# Patient Record
Sex: MALE | Race: WHITE | Employment: OTHER | URBAN - METROPOLITAN AREA
[De-identification: names, ages, dates, MRNs, and addresses within clinical notes are randomized per-mention and may not be internally consistent; named-entity substitution may affect disease eponyms.]

---

## 2017-10-20 ENCOUNTER — HOSPITAL ENCOUNTER (EMERGENCY)
Age: 57
Discharge: HOME | End: 2017-10-20
Attending: EMERGENCY MEDICINE
Payer: SELF-PAY

## 2017-10-20 VITALS
TEMPERATURE: 98 F | DIASTOLIC BLOOD PRESSURE: 81 MMHG | WEIGHT: 300 LBS | SYSTOLIC BLOOD PRESSURE: 153 MMHG | OXYGEN SATURATION: 97 % | HEIGHT: 70 IN | RESPIRATION RATE: 16 BRPM | HEART RATE: 73 BPM | BODY MASS INDEX: 42.95 KG/M2

## 2017-10-20 DIAGNOSIS — B86 SCABIES: Primary | ICD-10-CM

## 2017-10-20 DIAGNOSIS — R03.0 ELEVATED BLOOD PRESSURE READING: ICD-10-CM

## 2017-10-20 PROCEDURE — 99282 EMERGENCY DEPT VISIT SF MDM: CPT

## 2017-10-20 RX ORDER — PERMETHRIN 50 MG/G
CREAM TOPICAL
Qty: 60 G | Refills: 0 | Status: SHIPPED | OUTPATIENT
Start: 2017-10-20 | End: 2017-11-19

## 2017-10-20 NOTE — DISCHARGE INSTRUCTIONS
Scabies: Care Instructions  Your Care Instructions  Scabies is a skin problem that can cause intense itching. It is caused by very tiny bugs called mites that dig just under the skin and lay eggs. An allergic reaction to the mites causes the itching. Scabies is usually spread by person-to-person contact. It is also possible, but not common, for scabies to spread through towels, clothes, and bedding. Everyone in your household should be treated. Scabies is treated with medicine. Itching may last for several weeks after treatment. Follow-up care is a key part of your treatment and safety. Be sure to make and go to all appointments, and call your doctor if you are having problems. It's also a good idea to know your test results and keep a list of the medicines you take. How can you care for yourself at home? · Use the lotion or cream your doctor recommends or prescribes. One treatment usually cures scabies. Do not use the cream again unless your doctor tells you to. · Wash all clothes, bedding, and towels that you used in the 3 days before you started treatment. Use hot water, and use the hot cycle in the dryer. Another option is to dry-clean these items. Or seal them in a plastic bag for 3 to 7 days. · Take an oral antihistamine, such as loratadine (Claritin) or diphenhydramine (Benadryl), to help stop itching. You also can use a nonprescription anti-itch cream. Read and follow all instructions on the label. · Do not have physical contact with other people or let anyone use your personal items until you have finished treatment. Do not use other people's personal items until your treatment is done. Tell people with whom you have close contact that they will need treatment if they have symptoms. · Take an oatmeal bath to help relieve itching. Add a handful of oatmeal (ground to a powder) to your bath. Or you can try an oatmeal bath product, such as Aveeno. When should you call for help?   Call your doctor now or seek immediate medical care if:  · You have signs of infection, such as:  ¨ Increased pain, swelling, warmth, or redness. ¨ Red streaks leading from the mite bites. ¨ Pus draining from a bite area. ¨ A fever. Watch closely for changes in your health, and be sure to contact your doctor if:  · Anyone else in your family has itching. · You do not get better within 2 weeks. Where can you learn more? Go to http://anu-stella.info/. Enter N067 in the search box to learn more about \"Scabies: Care Instructions. \"  Current as of: October 13, 2016  Content Version: 11.3  © 0449-2946 [facility]. Care instructions adapted under license by [employer] (which disclaims liability or warranty for this information). If you have questions about a medical condition or this instruction, always ask your healthcare professional. [place] [address] any warranty or liability for your use of this information. Elevated Blood Pressure: Care Instructions  Your Care Instructions    Blood pressure is a measure of how hard the blood pushes against the walls of your arteries. It's normal for blood pressure to go up and down throughout the day. But if it stays up over time, you have high blood pressure. Two numbers tell you your blood pressure. The first number is the systolic pressure. It shows how hard the blood pushes when your heart is pumping. The second number is the diastolic pressure. It shows how hard the blood pushes between heartbeats, when your heart is relaxed and filling with blood. An ideal blood pressure in adults is less than 120/80 (say \"120 over 80\"). High blood pressure is 140/90 or higher. You have high blood pressure if your top number is 140 or higher or your bottom number is 90 or higher, or both. The main test for high blood pressure is simple, fast, and painless.  To diagnose high blood pressure, your doctor will test your blood pressure at different times. After testing your blood pressure, your doctor may ask you to test it again when you are home. If you are diagnosed with high blood pressure, you can work with your doctor to make a long-term plan to manage it. Follow-up care is a key part of your treatment and safety. Be sure to make and go to all appointments, and call your doctor if you are having problems. It's also a good idea to know your test results and keep a list of the medicines you take. How can you care for yourself at home? · Do not smoke. Smoking increases your risk for heart attack and stroke. If you need help quitting, talk to your doctor about stop-smoking programs and medicines. These can increase your chances of quitting for good. · Stay at a healthy weight. · Try to limit how much sodium you eat to less than 2,300 milligrams (mg) a day. Your doctor may ask you to try to eat less than 1,500 mg a day. · Be physically active. Get at least 30 minutes of exercise on most days of the week. Walking is a good choice. You also may want to do other activities, such as running, swimming, cycling, or playing tennis or team sports. · Avoid or limit alcohol. Talk to your doctor about whether you can drink any alcohol. · Eat plenty of fruits, vegetables, and low-fat dairy products. Eat less saturated and total fats. · Learn how to check your blood pressure at home. When should you call for help? Call your doctor now or seek immediate medical care if:  · Your blood pressure is much higher than normal (such as 180/110 or higher). · You think high blood pressure is causing symptoms such as:  ¨ Severe headache. ¨ Blurry vision. Watch closely for changes in your health, and be sure to contact your doctor if:  · You do not get better as expected. Where can you learn more? Go to http://anu-stella.info/. Enter O668 in the search box to learn more about \"Elevated Blood Pressure: Care Instructions. \"  Current as of: April 3, 2017  Content Version: 11.3  © 4209-0389 [employer]. Care instructions adapted under license by [employer] (which disclaims liability or warranty for this information). If you have questions about a medical condition or this instruction, always ask your healthcare professional. [address] any warranty or liability for your use of this information.

## 2017-10-20 NOTE — ED PROVIDER NOTES
HPI Comments: 11:25 AM Lesli Jama is a 65 y.o. male w/ PMHx of epilepsy and melanoma who presents to the ED c/o skin irritation, possibly insect btes, on bilateral hips and inner thighs onset 4 days ago. Pt claims he has spent time with his grandkids recently who currently have ringworm. Pt states the irritation is itchy but he has not used any OTC medication or ointments on the areas. Pt denies fever and emesis but admits to occasional marijuana use, most recently last night. Pt also stated he does not think the Dilantin alone is effective in controlling his seizures. Pt has no other sx or complaints. Past Medical History:   Diagnosis Date    Cancer ([facility]     melanoma    Other ill-defined conditions(799.89)     Seizure ([facility]     Seizures ([facility]        History reviewed. No pertinent surgical history. History reviewed. No pertinent family history. Social History     Social History    Marital status: SINGLE     Spouse name: N/A    Number of children: N/A    Years of education: N/A     Occupational History    Not on file. Social History Main Topics    Smoking status: Never Smoker    Smokeless tobacco: Never Used    Alcohol use Yes      Comment: 3x/week    Drug use: Yes     Special: Marijuana      Comment: last used 10/19/17    Sexual activity: Not on file     Other Topics Concern    Not on file     Social History Narrative         ALLERGIES: Review of patient's allergies indicates no known allergies. Review of Systems   Constitutional: Negative for fever. Gastrointestinal: Negative for vomiting. Skin:        Irritation to bilateral hips and bilateral inner thighs   All other systems reviewed and are negative. Vitals:    10/20/17 1121   BP: 153/81   Pulse: 73   Resp: 16   Temp: 98 °F (36.7 °C)   SpO2: 97%   Weight: 136.1 kg (300 lb)   Height: 5' 10\" (1.778 m)            Physical Exam   Constitutional: He is oriented to person, place, and time.  He appears well-developed and well-nourished. HENT:   Head: Normocephalic and atraumatic. Neck: Neck supple. No JVD present. Musculoskeletal: He exhibits no edema. Neurological: He is alert and oriented to person, place, and time. Skin: Skin is warm and dry. Multiple scattered areas with erythema and scabbing, R upper thigh, Bl forearms, left lower abdomen  No vesicles, no petechia or purpura  No lesions in interdigit web space  No lesions on palms or soles   Psychiatric: Judgment normal.        MDM  Number of Diagnoses or Management Options  Elevated blood pressure reading:   Scabies:   Diagnosis management comments: 64 y/o male presents with rash and itching    Does not seem consistent with bed bugs, not in expected distribution, suspect scabies and pt states \"thats what my grandkids had\"  Will give permethrin, advised pcp follow up    Discussed return precautions. ED Course       Procedures  Vitals:  Patient Vitals for the past 12 hrs:   Temp Pulse Resp BP SpO2   10/20/17 1121 98 °F (36.7 °C) 73 16 153/81 97 %       Diagnosis:   1. Scabies    2. Elevated blood pressure reading        Disposition: discharged    Follow-up Information     Follow up With Details Comments [address], [place] Call in 2 days As needed [address] [facility] EMERGENCY DEPT  As needed, If symptoms worsen [address] [facility] [address] [address] [phone]           Patient's Medications   Start Taking    PERMETHRIN (ACTICIN) 5 % TOPICAL CREAM    Apply to entire body, then rinse off after 8 hours. Repeat in 1 week. Continue Taking    PHENYTOIN ER (DILANTIN ER) 100 MG ER CAPSULE    Take  by mouth two (2) times a day.  Indications: Unknown amount   These Medications have changed    No medications on file   Stop Taking    No medications on file       [address]ente acting as a scribe for and in the presence of Daniel Monsivais DO      October 20, 2017 at 11:28 AM       Provider Attestation:      I personally performed the services described in the documentation, reviewed the documentation, as recorded by the scribe in my presence, and it accurately and completely records my words and actions.  October 20, 2017 at 11:28 AM - Baylee Rush DO

## 2018-03-12 ENCOUNTER — ANESTHESIA EVENT (OUTPATIENT)
Dept: ENDOSCOPY | Age: 58
End: 2018-03-12
Payer: COMMERCIAL

## 2018-03-13 ENCOUNTER — HOSPITAL ENCOUNTER (OUTPATIENT)
Age: 58
Setting detail: OUTPATIENT SURGERY
Discharge: HOME | End: 2018-03-13
Attending: INTERNAL MEDICINE | Admitting: INTERNAL MEDICINE
Payer: COMMERCIAL

## 2018-03-13 ENCOUNTER — ANESTHESIA (OUTPATIENT)
Dept: ENDOSCOPY | Age: 58
End: 2018-03-13
Payer: COMMERCIAL

## 2018-03-13 VITALS
OXYGEN SATURATION: 100 % | SYSTOLIC BLOOD PRESSURE: 126 MMHG | RESPIRATION RATE: 16 BRPM | HEART RATE: 72 BPM | TEMPERATURE: 97.1 F | DIASTOLIC BLOOD PRESSURE: 76 MMHG | WEIGHT: 315 LBS | BODY MASS INDEX: 45.1 KG/M2 | HEIGHT: 70 IN

## 2018-03-13 PROCEDURE — 74011250636 HC RX REV CODE- 250/636: Performed by: NURSE ANESTHETIST, CERTIFIED REGISTERED

## 2018-03-13 PROCEDURE — 88305 TISSUE EXAM BY PATHOLOGIST: CPT | Performed by: INTERNAL MEDICINE

## 2018-03-13 PROCEDURE — 76060000031 HC ANESTHESIA FIRST 0.5 HR: Performed by: INTERNAL MEDICINE

## 2018-03-13 PROCEDURE — 74011000250 HC RX REV CODE- 250

## 2018-03-13 PROCEDURE — 77030034690 HC DEV ENDO SNR RETRV STRC -B: Performed by: INTERNAL MEDICINE

## 2018-03-13 PROCEDURE — 77030031670 HC DEV INFL ENDOTEK BIG60 MRTM -B: Performed by: INTERNAL MEDICINE

## 2018-03-13 PROCEDURE — 76040000019: Performed by: INTERNAL MEDICINE

## 2018-03-13 PROCEDURE — 77030011640 HC PAD GRND REM COVD -A: Performed by: INTERNAL MEDICINE

## 2018-03-13 PROCEDURE — 74011250636 HC RX REV CODE- 250/636

## 2018-03-13 RX ORDER — FAMOTIDINE 20 MG/1
20 TABLET, FILM COATED ORAL ONCE
Status: DISCONTINUED | OUTPATIENT
Start: 2018-03-13 | End: 2018-03-13 | Stop reason: HOSPADM

## 2018-03-13 RX ORDER — LIDOCAINE HYDROCHLORIDE 20 MG/ML
INJECTION, SOLUTION EPIDURAL; INFILTRATION; INTRACAUDAL; PERINEURAL AS NEEDED
Status: DISCONTINUED | OUTPATIENT
Start: 2018-03-13 | End: 2018-03-13 | Stop reason: HOSPADM

## 2018-03-13 RX ORDER — SODIUM CHLORIDE 0.9 % (FLUSH) 0.9 %
5-10 SYRINGE (ML) INJECTION EVERY 8 HOURS
Status: CANCELLED | OUTPATIENT
Start: 2018-03-13 | End: 2018-03-13

## 2018-03-13 RX ORDER — SODIUM CHLORIDE, SODIUM LACTATE, POTASSIUM CHLORIDE, CALCIUM CHLORIDE 600; 310; 30; 20 MG/100ML; MG/100ML; MG/100ML; MG/100ML
75 INJECTION, SOLUTION INTRAVENOUS CONTINUOUS
Status: DISCONTINUED | OUTPATIENT
Start: 2018-03-13 | End: 2018-03-13 | Stop reason: HOSPADM

## 2018-03-13 RX ORDER — SODIUM CHLORIDE 0.9 % (FLUSH) 0.9 %
5-10 SYRINGE (ML) INJECTION AS NEEDED
Status: CANCELLED | OUTPATIENT
Start: 2018-03-13 | End: 2018-03-13

## 2018-03-13 RX ORDER — PROPOFOL 10 MG/ML
INJECTION, EMULSION INTRAVENOUS AS NEEDED
Status: DISCONTINUED | OUTPATIENT
Start: 2018-03-13 | End: 2018-03-13 | Stop reason: HOSPADM

## 2018-03-13 RX ORDER — LIDOCAINE HYDROCHLORIDE 10 MG/ML
0.1 INJECTION, SOLUTION EPIDURAL; INFILTRATION; INTRACAUDAL; PERINEURAL AS NEEDED
Status: DISCONTINUED | OUTPATIENT
Start: 2018-03-13 | End: 2018-03-13 | Stop reason: HOSPADM

## 2018-03-13 RX ORDER — DEXTROMETHORPHAN/PSEUDOEPHED 2.5-7.5/.8
1.2 DROPS ORAL
Status: CANCELLED | OUTPATIENT
Start: 2018-03-13

## 2018-03-13 RX ADMIN — PROPOFOL 50 MG: 10 INJECTION, EMULSION INTRAVENOUS at 08:24

## 2018-03-13 RX ADMIN — SODIUM CHLORIDE, SODIUM LACTATE, POTASSIUM CHLORIDE, AND CALCIUM CHLORIDE: 600; 310; 30; 20 INJECTION, SOLUTION INTRAVENOUS at 08:15

## 2018-03-13 RX ADMIN — PROPOFOL 20 MG: 10 INJECTION, EMULSION INTRAVENOUS at 08:31

## 2018-03-13 RX ADMIN — PROPOFOL 50 MG: 10 INJECTION, EMULSION INTRAVENOUS at 08:25

## 2018-03-13 RX ADMIN — PROPOFOL 50 MG: 10 INJECTION, EMULSION INTRAVENOUS at 08:35

## 2018-03-13 RX ADMIN — PROPOFOL 50 MG: 10 INJECTION, EMULSION INTRAVENOUS at 08:26

## 2018-03-13 RX ADMIN — LIDOCAINE HYDROCHLORIDE 50 MG: 20 INJECTION, SOLUTION EPIDURAL; INFILTRATION; INTRACAUDAL; PERINEURAL at 08:23

## 2018-03-13 RX ADMIN — PROPOFOL 20 MG: 10 INJECTION, EMULSION INTRAVENOUS at 08:33

## 2018-03-13 RX ADMIN — PROPOFOL 30 MG: 10 INJECTION, EMULSION INTRAVENOUS at 08:29

## 2018-03-13 RX ADMIN — PROPOFOL 50 MG: 10 INJECTION, EMULSION INTRAVENOUS at 08:23

## 2018-03-13 NOTE — ANESTHESIA POSTPROCEDURE EVALUATION
Post-Anesthesia Evaluation and Assessment    Patient: Stanislav Reynoso MRN: [id number]  SSN: [id number]    YOB: 1960  Age: 65 y.o. Sex: male       Cardiovascular Function/Vital Signs  Visit Vitals    /76    Pulse 72    Temp 36.2 °C (97.1 °F)    Resp 16    Ht 5' 10\" (1.778 m)    Wt 146.1 kg (322 lb)    SpO2 100%    BMI 46.2 kg/m2       Patient is status post MAC anesthesia for Procedure(s):  COLONOSCOPY. Nausea/Vomiting: None    Postoperative hydration reviewed and adequate. Pain:  Pain Scale 1: Numeric (0 - 10) (03/13/18 0841)  Pain Intensity 1: 0 (03/13/18 0841)   Managed    Neurological Status: At baseline    Mental Status and Level of Consciousness: Arousable    Pulmonary Status:   O2 Device: Room air (03/13/18 0843)   Adequate oxygenation and airway patent    Complications related to anesthesia: None    Post-anesthesia assessment completed.  No concerns    Signed By: Mann Schaeffer MD     March 13, 2018

## 2018-03-13 NOTE — DISCHARGE INSTRUCTIONS
Colonoscopy: What to Expect at [address] [place] [address]  After you have a colonoscopy, you will stay at the clinic for 1 to 2 hours until the medicines wear off. Then you can go home. But you will need to arrange for a ride. Your doctor will tell you when you can eat and do your other usual activities. Your doctor will talk to you about when you will need your next colonoscopy. Your doctor can help you decide how often you need to be checked. This will depend on the results of your test and your risk for colorectal cancer. After the test, you may be bloated or have gas pains. You may need to pass gas. If a biopsy was done or a polyp was removed, you may have streaks of blood in your stool (feces) for a few days. This care sheet gives you a general idea about how long it will take for you to recover. But each person recovers at a different pace. Follow the steps below to get better as quickly as possible. How can you care for yourself at home? Activity  ? · Rest when you feel tired. ? · You can do your normal activities when it feels okay to do so. Diet  ? · Follow your doctor's directions for eating. ? · Unless your doctor has told you not to, drink plenty of fluids. This helps to replace the fluids that were lost during the colon prep. ? · Do not drink alcohol. Medicines  ? · Your doctor will tell you if and when you can restart your medicines. He or she will also give you instructions about taking any new medicines. ? · If you take blood thinners, such as warfarin (Coumadin), clopidogrel (Plavix), or aspirin, be sure to talk to your doctor. He or she will tell you if and when to start taking those medicines again. Make sure that you understand exactly what your doctor wants you to do. ? · If polyps were removed or a biopsy was done during the test, your doctor may tell you not to take aspirin or other anti-inflammatory medicines for a few days.  These include ibuprofen (Advil, Motrin) and naproxen (Aleve). Other instructions  ? · For your safety, do not drive or operate machinery until the medicine wears off and you can think clearly. Your doctor may tell you not to drive or operate machinery until the day after your test.   ? · Do not sign legal documents or make major decisions until the medicine wears off and you can think clearly. The anesthesia can make it hard for you to fully understand what you are agreeing to. Follow-up care is a key part of your treatment and safety. Be sure to make and go to all appointments, and call your doctor if you are having problems. It's also a good idea to know your test results and keep a list of the medicines you take. When should you call for help? Call 911 anytime you think you may need emergency care. For example, call if:  ? · You passed out (lost consciousness). ? · You pass maroon or bloody stools. ? · You have trouble breathing. ?Call your doctor now or seek immediate medical care if:  ? · You have pain that does not get better after you take pain medicine. ? · You are sick to your stomach or cannot drink fluids. ? · You have new or worse belly pain. ? · You have blood in your stools. ? · You have a fever. ? · You cannot pass stools or gas. ? Watch closely for changes in your health, and be sure to contact your doctor if you have any problems. Where can you learn more? Go to http://anu-stella.info/. Enter E264 in the search box to learn more about \"Colonoscopy: What to Expect at Home. \"  Current as of: May 12, 2017  Content Version: 11.4  © 7793-7591 [employer]. Care instructions adapted under license by [employer] (which disclaims liability or warranty for this information).  If you have questions about a medical condition or this instruction, always ask your healthcare professional. [address] any warranty or liability for your use of this information. DISCHARGE SUMMARY from Nurse    PATIENT INSTRUCTIONS:    After general anesthesia or intravenous sedation, for 24 hours or while taking prescription Narcotics:  · Limit your activities  · Do not drive and operate hazardous machinery  · Do not make important personal or business decisions  · Do  not drink alcoholic beverages  · If you have not urinated within 8 hours after discharge, please contact your surgeon on call. Report the following to your surgeon:  · Excessive pain, swelling, redness or odor of or around the surgical area  · Temperature over 100.5  · Nausea and vomiting lasting longer than 4 hours or if unable to take medications  · Any signs of decreased circulation or nerve impairment to extremity: change in color, persistent  numbness, tingling, coldness or increase pain  · Any questions    What to do at Home:    These are general instructions for a healthy lifestyle:    No smoking/ No tobacco products/ Avoid exposure to second hand smoke  Surgeon General's Warning:  Quitting smoking now greatly reduces serious risk to your health. Obesity, smoking, and sedentary lifestyle greatly increases your risk for illness    A healthy diet, regular physical exercise & weight monitoring are important for maintaining a healthy lifestyle    You may be retaining fluid if you have a history of heart failure or if you experience any of the following symptoms:  Weight gain of 3 pounds or more overnight or 5 pounds in a week, increased swelling in our hands or feet or shortness of breath while lying flat in bed. Please call your doctor as soon as you notice any of these symptoms; do not wait until your next office visit.     Recognize signs and symptoms of STROKE:    F-face looks uneven    A-arms unable to move or move unevenly    S-speech slurred or non-existent    T-time-call 911 as soon as signs and symptoms begin-DO NOT go       Back to bed or wait to see if you get better-TIME IS BRAIN. Warning Signs of HEART ATTACK     Call 911 if you have these symptoms:   Chest discomfort. Most heart attacks involve discomfort in the center of the chest that lasts more than a few minutes, or that goes away and comes back. It can feel like uncomfortable pressure, squeezing, fullness, or pain.  Discomfort in other areas of the upper body. Symptoms can include pain or discomfort in one or both arms, the back, neck, jaw, or stomach.  Shortness of breath with or without chest discomfort.  Other signs may include breaking out in a cold sweat, nausea, or lightheadedness. Don't wait more than five minutes to call 911 - MINUTES MATTER! Fast action can save your life. Calling 911 is almost always the fastest way to get lifesaving treatment. Emergency Medical Services staff can begin treatment when they arrive -- up to an hour sooner than if someone gets to the hospital by car. The discharge information has been reviewed with the patient. The patient verbalized understanding. Discharge medications reviewed with the patient and appropriate educational materials and side effects teaching were provided. ___________________________________________________________________________________________________________________________________  Patient armband removed and given to patient to take home.   Patient was informed of the privacy risks if armband lost or stolen

## 2018-03-13 NOTE — H&P
Gastroenterology Consult     Referring Physician: Zac Hdez    Consult Date: 3/13/2018     Subjective:     Chief Complaint: screening colonoscopy  History of Present Illness: Sasha Wolfe is a 65 y.o. male who is seen in consultation for screening colonoscopy. Patient was evaluated and examined in the office. Please see scanned note. No interval changes in medical status or examination. Past Medical History:   Diagnosis Date    Cancer ([address]     melanoma    Other ill-defined conditions(799.89)     Seizure ([facility]     Seizures ([facility] [address]      Past Surgical History:   Procedure Laterality Date    HX APPENDECTOMY        History reviewed. No pertinent family history. Social History   Substance Use Topics    Smoking status: Never Smoker    Smokeless tobacco: Never Used    Alcohol use 6.0 oz/week     10 Cans of beer per week      No Known Allergies  Current Facility-Administered Medications   Medication Dose Route Frequency    lidocaine (PF) (XYLOCAINE) 10 mg/mL (1 %) injection 0.1 mL  0.1 mL SubCUTAneous PRN    lactated Ringers infusion  75 mL/hr IntraVENous CONTINUOUS    famotidine (PEPCID) tablet 20 mg  20 mg Oral ONCE        Review of Systems:  A detailed 10 organ review of systems is obtained with pertinent positives as listed in the History of Present Illness and Past Medical History. All others are negative. Objective:     Physical Exam:  Visit Vitals    /80    Pulse 87    Temp 97.1 °F (36.2 °C)    Resp 15    Ht 5' 10\" (1.778 m)    Wt 146.1 kg (322 lb)    SpO2 97%    BMI 46.2 kg/m2        Skin:  Extremities and face reveal no rashes. No simmons erythema. No telangiectasias on the chest wall. HEENT: Sclerae anicteric. Extra-occular muscles are intact. No oral ulcers. No abnormal pigmentation of the lips. The neck is supple. Cardiovascular: Regular rate and rhythm. No murmurs, gallops, or rubs. PMI nondisplaced. Carotids without bruits.   Respiratory:  Comfortable breathing with no accessory muscle use. Clear breath sounds with no wheezes, rales, or rhonchi. GI:  Abdomen nondistended, soft, and nontender. Normal active bowel sounds. No enlargement of the liver or spleen. No masses palpable. Rectal:  Deferred  Musculoskeletal:  No pitting edema of the lower legs. Extremities have good range of motion. No costovertebral tenderness. Neurological:  Gross memory appears intact. Patient is alert and oriented. Psychiatric:  Mood appears appropriate with judgement intact. Lymphatic:  No cervical or supraclavicular adenopathy.       Assessment/Plan:     Screening colonoscopy as planned

## 2018-03-13 NOTE — ANESTHESIA PREPROCEDURE EVALUATION
Anesthetic History   No history of anesthetic complications            Review of Systems / Medical History  Patient summary reviewed and pertinent labs reviewed    Pulmonary  Within defined limits                 Neuro/Psych     seizures: well controlled         Cardiovascular  Within defined limits                Exercise tolerance: >4 METS     GI/Hepatic/Renal  Within defined limits              Endo/Other        Cancer     Other Findings   Comments: Documentation of current medication  Current medications obtained, documented and obtained? YES      Risk Factors for Postoperative nausea/vomiting:       History of postoperative nausea/vomiting? NO       Female? NO       Motion sickness? NO       Intended opioid administration for postoperative analgesia? NO      Smoking Abstinence:  Current Smoker? NO  Elective Surgery? YES  Seen preoperatively by anesthesiologist or proxy prior to day of surgery? YES  Pt abstained from smoking 24 hours prior to anesthesia?  N/A    Preventive care/screening for High Blood Pressure:  Aged 18 years and older: YES  Screened for high blood pressure: YES  Patients with high blood pressure referred to primary care provider   for BP management: YES                 Physical Exam    Airway  Mallampati: II  TM Distance: 4 - 6 cm  Neck ROM: normal range of motion   Mouth opening: Normal     Cardiovascular    Rhythm: regular  Rate: normal         Dental  No notable dental hx       Pulmonary  Breath sounds clear to auscultation               Abdominal  GI exam deferred       Other Findings            Anesthetic Plan    ASA: 3  Anesthesia type: MAC            Anesthetic plan and risks discussed with: Patient

## 2018-03-13 NOTE — IP AVS SNAPSHOT
Katerina Mei 
 
 
 [address] 
[phone] Patient: Porsha Merida MRN: [id number] Manuel Valdes About your hospitalization You were admitted on:  March 13, 2018 You last received care in the:  [facility] ENDOSCOPY You were discharged on:  March 13, 2018 Why you were hospitalized Your primary diagnosis was:  Not on File Follow-up Information None Discharge Orders None A check vandana indicates which time of day the medication should be taken. My Medications ASK your doctor about these medications Instructions Each Dose to Equal  
 Morning Noon Evening Bedtime ADVIL 100 mg tablet Generic drug:  ibuprofen Your last dose was: Your next dose is: Take 100 mg by mouth every six (6) hours as needed for Pain. 100 mg  
    
   
   
   
  
 phenytoin  mg ER capsule Commonly known as:  DILANTIN ER Your last dose was: Your next dose is: Take  by mouth two (2) times a day. Indications: Unknown amount Discharge Instructions Colonoscopy: What to Expect at [facility] Your Recovery After you have a colonoscopy, you will stay at the clinic for 1 to 2 hours until the medicines wear off. Then you can go home. But you will need to arrange for a ride. Your doctor will tell you when you can eat and do your other usual activities. Your doctor will talk to you about when you will need your next colonoscopy. Your doctor can help you decide how often you need to be checked. This will depend on the results of your test and your risk for colorectal cancer. After the test, you may be bloated or have gas pains. You may need to pass gas. If a biopsy was done or a polyp was removed, you may have streaks of blood in your stool (feces) for a few days.  
This care sheet gives you a general idea about how long it will take for you to recover. But each person recovers at a different pace. Follow the steps below to get better as quickly as possible. How can you care for yourself at home? Activity ? · Rest when you feel tired. ? · You can do your normal activities when it feels okay to do so. Diet ? · Follow your doctor's directions for eating. ? · Unless your doctor has told you not to, drink plenty of fluids. This helps to replace the fluids that were lost during the colon prep. ? · Do not drink alcohol. Medicines ? · Your doctor will tell you if and when you can restart your medicines. He or she will also give you instructions about taking any new medicines. ? · If you take blood thinners, such as warfarin (Coumadin), clopidogrel (Plavix), or aspirin, be sure to talk to your doctor. He or she will tell you if and when to start taking those medicines again. Make sure that you understand exactly what your doctor wants you to do. ? · If polyps were removed or a biopsy was done during the test, your doctor may tell you not to take aspirin or other anti-inflammatory medicines for a few days. These include ibuprofen (Advil, Motrin) and naproxen (Aleve). Other instructions ? · For your safety, do not drive or operate machinery until the medicine wears off and you can think clearly. Your doctor may tell you not to drive or operate machinery until the day after your test.  
? · Do not sign legal documents or make major decisions until the medicine wears off and you can think clearly. The anesthesia can make it hard for you to fully understand what you are agreeing to. Follow-up care is a key part of your treatment and safety. Be sure to make and go to all appointments, and call your doctor if you are having problems. It's also a good idea to know your test results and keep a list of the medicines you take. When should you call for help? Call 911 anytime you think you may need emergency care. For example, call if: ? · You passed out (lost consciousness). ? · You pass maroon or bloody stools. ? · You have trouble breathing. ?Call your doctor now or seek immediate medical care if: 
? · You have pain that does not get better after you take pain medicine. ? · You are sick to your stomach or cannot drink fluids. ? · You have new or worse belly pain. ? · You have blood in your stools. ? · You have a fever. ? · You cannot pass stools or gas. ? Watch closely for changes in your health, and be sure to contact your doctor if you have any problems. Where can you learn more? Go to http://anu-stella.info/. Enter E264 in the search box to learn more about \"Colonoscopy: What to Expect at Home. \" Current as of: May 12, 2017 Content Version: 11.4 © 0329-6305 [employer]. Care instructions adapted under license by [employer] (which disclaims liability or warranty for this information). If you have questions about a medical condition or this instruction, always ask your healthcare professional. [place] [address] any warranty or liability for your use of this information. DISCHARGE SUMMARY from Nurse PATIENT INSTRUCTIONS: 
 
After general anesthesia or intravenous sedation, for 24 hours or while taking prescription Narcotics: · Limit your activities · Do not drive and operate hazardous machinery · Do not make important personal or business decisions · Do  not drink alcoholic beverages · If you have not urinated within 8 hours after discharge, please contact your surgeon on call. Report the following to your surgeon: 
· Excessive pain, swelling, redness or odor of or around the surgical area · Temperature over 100.5 · Nausea and vomiting lasting longer than 4 hours or if unable to take medications · Any signs of decreased circulation or nerve impairment to extremity: change in color, persistent  numbness, tingling, coldness or increase pain · Any questions What to do at Home: These are general instructions for a healthy lifestyle: No smoking/ No tobacco products/ Avoid exposure to second hand smoke Surgeon General's Warning:  Quitting smoking now greatly reduces serious risk to your health. Obesity, smoking, and sedentary lifestyle greatly increases your risk for illness A healthy diet, regular physical exercise & weight monitoring are important for maintaining a healthy lifestyle You may be retaining fluid if you have a history of heart failure or if you experience any of the following symptoms:  Weight gain of 3 pounds or more overnight or 5 pounds in a week, increased swelling in our hands or feet or shortness of breath while lying flat in bed. Please call your doctor as soon as you notice any of these symptoms; do not wait until your next office visit. Recognize signs and symptoms of STROKE: 
 
F-face looks uneven A-arms unable to move or move unevenly S-speech slurred or non-existent T-time-call 911 as soon as signs and symptoms begin-DO NOT go Back to bed or wait to see if you get better-TIME IS BRAIN. Warning Signs of HEART ATTACK Call 911 if you have these symptoms: 
? Chest discomfort. Most heart attacks involve discomfort in the center of the chest that lasts more than a few minutes, or that goes away and comes back. It can feel like uncomfortable pressure, squeezing, fullness, or pain. ? Discomfort in other areas of the upper body. Symptoms can include pain or discomfort in one or both arms, the back, neck, jaw, or stomach. ? Shortness of breath with or without chest discomfort. ? Other signs may include breaking out in a cold sweat, nausea, or lightheadedness. Don't wait more than five minutes to call [employer]! Fast action can save your life. Calling 911 is almost always the fastest way to get lifesaving treatment.  Emergency Medical Services staff can begin treatment when they arrive  up to an hour sooner than if someone gets to the hospital by car. The discharge information has been reviewed with the patient. The patient verbalized understanding. Discharge medications reviewed with the patient and appropriate educational materials and side effects teaching were provided. ___________________________________________________________________________________________________________________________________ Patient armband removed and given to patient to take home. Patient was informed of the privacy risks if armband lost or stolen Introducing [facility]! Venu Mejia introduces [employer] patient portal. Now you can access parts of your medical record, email your doctor's office, and request medication refills online. 1. In your internet browser, go to [email or web address] 2. Click on the First Time User? Click Here link in the Sign In box. You will see the New Member Sign Up page. 3. Enter your [employer] Access Code exactly as it appears below. You will not need to use this code after youve completed the sign-up process. If you do not sign up before the expiration date, you must request a new code. · [employer] Access Code: [email or web address] Expires: 6/11/2018  8:23 AM 
 
4. Enter the last four digits of your Social Security Number (xxxx) and Date of Birth (mm/dd/yyyy) as indicated and click Submit. You will be taken to the next sign-up page. 5. Create a [employer] ID. This will be your [employer] login ID and cannot be changed, so think of one that is secure and easy to remember. 6. Create a [employer] password. You can change your password at any time. 7. Enter your Password Reset Question and Answer. This can be used at a later time if you forget your password. 8. Enter your e-mail address. You will receive e-mail notification when new information is available in [address] 9. Click Sign Up. You can now view and download portions of your medical record. 10. Click the Download Summary menu link to download a portable copy of your medical information. If you have questions, please visit the Frequently Asked Questions section of the [employer] website. Remember, [employer] is NOT to be used for urgent needs. For medical emergencies, dial 911. Now available from your iPhone and Android! Providers Seen During Your Hospitalization Provider Specialty Primary office phone Romelia Carolina MD Gastroenterology [phone] Your Primary Care Physician (PCP) Primary Care Physician Office Phone Office Fax [address] [phone] You are allergic to the following No active allergies Recent Documentation Height Weight BMI Smoking Status 1.778 m 146.1 kg 46.2 kg/m2 Never Smoker Emergency Contacts Name Discharge Info Relation Home Work Mobile Marlee Gottlieb DISCHARGE CAREGIVER [3] Sister [23]   [phone] Patient Belongings The following personal items are in your possession at time of discharge: 
  Dental Appliances: None  Visual Aid: None Please provide this summary of care documentation to your next provider. Signatures-by signing, you are acknowledging that this After Visit Summary has been reviewed with you and you have received a copy. Patient Signature:  ____________________________________________________________ Date:  ____________________________________________________________  
  
Jalyn Velazco Provider Signature:  ____________________________________________________________ Date:  ____________________________________________________________

## 2018-03-15 NOTE — PROCEDURES
Colonoscopy Report    Patient: Cliff Chaudhry MRN: [id number]  SSN: [id number]    YOB: 1960  Age: 65 y.o. Sex: male      Date of Procedure: 3/15/2018    IMPRESSION:  1. Ascending colon polyp, 6 mm, flat, status post forceps removed  2. Sigmoid colon polyp, 8 mm, hot snare removed  3. Diverticulosis, mild  4. Internal hemorrhoids    RECOMMENDATIONS:  1. Resume regular diet, Recommend high fiber. 2. Will contact with polyp results in 2 weeks. These results will determine timing to next screening. Patient will be instructed to contact our office if they have not received the results by three weeks. Indication:  Personal history of colon polyps (screening only)  Procedure Performed: Colonoscopy polypectomy (snare cautery), polypectomy (cold biopsy)  Endoscopist: Danny Madden MD  Assistant: Endoscopy Technician-1: Martha Richey  Endoscopy RN-1: Anant Wolfe RN  ASA: ASA 2 - Patient with mild systemic disease with no functional limitations  Mallampati Score: II (soft palate, uvula, fauces visible)  Anesthesia: MAC anesthesia Propofol  Endoscope:     [x]  CF H 190AL   []  PCF H190AL   []  GIF H 190    Extent of Examination:cecum, which was identified by the ileocecal valve and appendiceal orifice  Quality of Preparation:     []  Excellent   [x]  Very Good   [] Fair but adequate   [] Fair, inadequate   []  Poor      Technique: The procedure was discussed with the patient including risks, benefits, alternatives including risks of IV sedation, bleeding, perforation and missed polyp. A safety timeout was performed. The patient was given incremental doses of intravenous sedation to achieve moderate sedation. The patients vital signs were monitored at all times including heart rate, rhythm, blood pressure and oxygen saturation. The patient was placed in left lateral position. When adequate sedation was achieved a perianal inspection and a digital rectal exam were performed.  Video colonoscope was introduced into the rectum and advanced under direct vision up to the cecum, which was identified by the ileocecal valve and appendiceal orifice. The cecum was identified by IC valve, appendiceal orifice and crows foot. With adequate insufflation and maneuvering of the withdrawing scope, the colonic mucosa was visualized carefully. Retroflexion was performed in the rectum and the distal rectum visualized. The patient tolerated the procedure very well and was transferred to recovery area. Findings:  5. Ascending colon polyp, 6 mm, flat, status post forceps removed  6. Sigmoid colon polyp, 8 mm, hot snare removed  7. Diverticulosis, mild  8.  Internal hemorrhoids      EBL:Minimal  Specimen:   ID Type Source Tests Collected by Time Destination   1 : bx polyp Preservative Colon, Ascending  Chris Rodriguez MD 3/13/2018 1183 Pathology   2 : hotsnare polyp Preservative Sigmoid  Chris Rodriguez MD 3/13/2018 4925 Pathology       Chris Rodriguez MD  March 15, 2018  8:34 AM

## 2018-07-20 ENCOUNTER — HOSPITAL ENCOUNTER (EMERGENCY)
Age: 58
Discharge: HOME | End: 2018-07-20
Attending: EMERGENCY MEDICINE
Payer: COMMERCIAL

## 2018-07-20 ENCOUNTER — APPOINTMENT (OUTPATIENT)
Dept: ULTRASOUND IMAGING | Age: 58
End: 2018-07-20
Attending: EMERGENCY MEDICINE
Payer: COMMERCIAL

## 2018-07-20 VITALS
BODY MASS INDEX: 44.38 KG/M2 | TEMPERATURE: 97.9 F | WEIGHT: 310 LBS | DIASTOLIC BLOOD PRESSURE: 68 MMHG | SYSTOLIC BLOOD PRESSURE: 114 MMHG | HEIGHT: 70 IN | OXYGEN SATURATION: 97 % | RESPIRATION RATE: 16 BRPM | HEART RATE: 103 BPM

## 2018-07-20 DIAGNOSIS — R10.11 ABDOMINAL PAIN, RIGHT UPPER QUADRANT: Primary | ICD-10-CM

## 2018-07-20 LAB
ALBUMIN SERPL-MCNC: 2.7 G/DL (ref 3.4–5)
ALBUMIN/GLOB SERPL: 0.6 {RATIO} (ref 0.8–1.7)
ALP SERPL-CCNC: 132 U/L (ref 45–117)
ALT SERPL-CCNC: 35 U/L (ref 16–61)
ANION GAP SERPL CALC-SCNC: 6 MMOL/L (ref 3–18)
APPEARANCE UR: NORMAL
APTT PPP: 38.7 SEC (ref 23–36.4)
AST SERPL-CCNC: 53 U/L (ref 15–37)
BASOPHILS # BLD: 0.1 K/UL (ref 0–0.1)
BASOPHILS NFR BLD: 0 % (ref 0–2)
BILIRUB SERPL-MCNC: 1.9 MG/DL (ref 0.2–1)
BILIRUB UR QL: NEGATIVE
BUN SERPL-MCNC: 7 MG/DL (ref 7–18)
BUN/CREAT SERPL: 11 (ref 12–20)
CALCIUM SERPL-MCNC: 8.3 MG/DL (ref 8.5–10.1)
CHLORIDE SERPL-SCNC: 105 MMOL/L (ref 100–108)
CK MB CFR SERPL CALC: 3.1 % (ref 0–4)
CK MB SERPL-MCNC: 1.8 NG/ML (ref 5–25)
CK SERPL-CCNC: 58 U/L (ref 39–308)
CO2 SERPL-SCNC: 27 MMOL/L (ref 21–32)
COLOR UR: NORMAL
CREAT SERPL-MCNC: 0.62 MG/DL (ref 0.6–1.3)
DIFFERENTIAL METHOD BLD: ABNORMAL
EOSINOPHIL # BLD: 0.3 K/UL (ref 0–0.4)
EOSINOPHIL NFR BLD: 2 % (ref 0–5)
ERYTHROCYTE [DISTWIDTH] IN BLOOD BY AUTOMATED COUNT: 14.8 % (ref 11.6–14.5)
GLOBULIN SER CALC-MCNC: 4.9 G/DL (ref 2–4)
GLUCOSE SERPL-MCNC: 128 MG/DL (ref 74–99)
GLUCOSE UR STRIP.AUTO-MCNC: NEGATIVE MG/DL
HCT VFR BLD AUTO: 45.6 % (ref 36–48)
HGB BLD-MCNC: 15.8 G/DL (ref 13–16)
HGB UR QL STRIP: NEGATIVE
INR PPP: 1.5 (ref 0.8–1.2)
KETONES UR QL STRIP.AUTO: NEGATIVE MG/DL
LEUKOCYTE ESTERASE UR QL STRIP.AUTO: NEGATIVE
LIPASE SERPL-CCNC: 256 U/L (ref 73–393)
LYMPHOCYTES # BLD: 3 K/UL (ref 0.9–3.6)
LYMPHOCYTES NFR BLD: 19 % (ref 21–52)
MCH RBC QN AUTO: 31.6 PG (ref 24–34)
MCHC RBC AUTO-ENTMCNC: 34.6 G/DL (ref 31–37)
MCV RBC AUTO: 91.2 FL (ref 74–97)
MONOCYTES # BLD: 2.4 K/UL (ref 0.05–1.2)
MONOCYTES NFR BLD: 15 % (ref 3–10)
NEUTS SEG # BLD: 10.2 K/UL (ref 1.8–8)
NEUTS SEG NFR BLD: 64 % (ref 40–73)
NITRITE UR QL STRIP.AUTO: NEGATIVE
PH UR STRIP: 6 [PH] (ref 5–8)
PLATELET # BLD AUTO: 609 K/UL (ref 135–420)
PMV BLD AUTO: 10.5 FL (ref 9.2–11.8)
POTASSIUM SERPL-SCNC: 4.5 MMOL/L (ref 3.5–5.5)
PROT SERPL-MCNC: 7.6 G/DL (ref 6.4–8.2)
PROT UR STRIP-MCNC: NEGATIVE MG/DL
PROTHROMBIN TIME: 17.9 SEC (ref 11.5–15.2)
RBC # BLD AUTO: 5 M/UL (ref 4.7–5.5)
SODIUM SERPL-SCNC: 138 MMOL/L (ref 136–145)
SP GR UR REFRACTOMETRY: 1.01 (ref 1–1.03)
TROPONIN I SERPL-MCNC: <0.02 NG/ML (ref 0–0.04)
UROBILINOGEN UR QL STRIP.AUTO: 1 EU/DL (ref 0.2–1)
WBC # BLD AUTO: 16 K/UL (ref 4.6–13.2)

## 2018-07-20 PROCEDURE — 81003 URINALYSIS AUTO W/O SCOPE: CPT | Performed by: NURSE PRACTITIONER

## 2018-07-20 PROCEDURE — 85025 COMPLETE CBC W/AUTO DIFF WBC: CPT | Performed by: NURSE PRACTITIONER

## 2018-07-20 PROCEDURE — 74011250636 HC RX REV CODE- 250/636

## 2018-07-20 PROCEDURE — 74011250636 HC RX REV CODE- 250/636: Performed by: EMERGENCY MEDICINE

## 2018-07-20 PROCEDURE — 96374 THER/PROPH/DIAG INJ IV PUSH: CPT

## 2018-07-20 PROCEDURE — 74011250637 HC RX REV CODE- 250/637: Performed by: EMERGENCY MEDICINE

## 2018-07-20 PROCEDURE — 82550 ASSAY OF CK (CPK): CPT | Performed by: NURSE PRACTITIONER

## 2018-07-20 PROCEDURE — 76705 ECHO EXAM OF ABDOMEN: CPT

## 2018-07-20 PROCEDURE — 93005 ELECTROCARDIOGRAM TRACING: CPT

## 2018-07-20 PROCEDURE — 99285 EMERGENCY DEPT VISIT HI MDM: CPT

## 2018-07-20 PROCEDURE — 80053 COMPREHEN METABOLIC PANEL: CPT | Performed by: NURSE PRACTITIONER

## 2018-07-20 PROCEDURE — 83690 ASSAY OF LIPASE: CPT | Performed by: NURSE PRACTITIONER

## 2018-07-20 PROCEDURE — 85610 PROTHROMBIN TIME: CPT | Performed by: EMERGENCY MEDICINE

## 2018-07-20 PROCEDURE — 85730 THROMBOPLASTIN TIME PARTIAL: CPT | Performed by: EMERGENCY MEDICINE

## 2018-07-20 RX ORDER — MORPHINE SULFATE 4 MG/ML
4 INJECTION, SOLUTION INTRAMUSCULAR; INTRAVENOUS
Status: DISCONTINUED | OUTPATIENT
Start: 2018-07-20 | End: 2018-07-20

## 2018-07-20 RX ORDER — OXYCODONE HYDROCHLORIDE 5 MG/1
10 TABLET ORAL
Status: COMPLETED | OUTPATIENT
Start: 2018-07-20 | End: 2018-07-20

## 2018-07-20 RX ORDER — MORPHINE SULFATE 1 MG/ML
4 INJECTION, SOLUTION EPIDURAL; INTRATHECAL; INTRAVENOUS
Status: DISCONTINUED | OUTPATIENT
Start: 2018-07-20 | End: 2018-07-20 | Stop reason: HOSPADM

## 2018-07-20 RX ORDER — OMEPRAZOLE 20 MG/1
20 CAPSULE, DELAYED RELEASE ORAL DAILY
Qty: 28 CAP | Refills: 0 | Status: SHIPPED | OUTPATIENT
Start: 2018-07-20 | End: 2018-08-03

## 2018-07-20 RX ORDER — OXYCODONE HYDROCHLORIDE 5 MG/1
5 TABLET ORAL
Qty: 15 TAB | Refills: 0 | Status: SHIPPED | OUTPATIENT
Start: 2018-07-20 | End: 2019-10-25

## 2018-07-20 RX ORDER — ONDANSETRON 4 MG/1
4 TABLET, ORALLY DISINTEGRATING ORAL
Qty: 15 TAB | Refills: 0 | Status: SHIPPED | OUTPATIENT
Start: 2018-07-20 | End: 2019-08-19 | Stop reason: ALTCHOICE

## 2018-07-20 RX ORDER — ONDANSETRON 2 MG/ML
4 INJECTION INTRAMUSCULAR; INTRAVENOUS
Status: COMPLETED | OUTPATIENT
Start: 2018-07-20 | End: 2018-07-20

## 2018-07-20 RX ORDER — MORPHINE SULFATE 10 MG/ML
INJECTION, SOLUTION INTRAMUSCULAR; INTRAVENOUS
Status: COMPLETED
Start: 2018-07-20 | End: 2018-07-20

## 2018-07-20 RX ADMIN — OXYCODONE HYDROCHLORIDE 10 MG: 5 TABLET ORAL at 20:24

## 2018-07-20 RX ADMIN — MORPHINE SULFATE 4 MG: 10 INJECTION INTRAMUSCULAR; INTRAVENOUS; SUBCUTANEOUS at 19:20

## 2018-07-20 RX ADMIN — ONDANSETRON 4 MG: 2 INJECTION, SOLUTION INTRAMUSCULAR; INTRAVENOUS at 18:01

## 2018-07-20 NOTE — ED NOTES
I performed a brief evaluation, including history and physical, of the patient here in triage and I have determined that pt will need further treatment and evaluation from the main side ER physician. I have placed initial orders to help in expediting patients care. July 20, 2018 at 2:51 PM - Charisse Maldonado NP        Severe right upper abdominal pain radiated right posterior thoracic region and to epigastric region.   Pain for 2-3 months, reports cant eat because of pain

## 2018-07-20 NOTE — ED PROVIDER NOTES
EMERGENCY DEPARTMENT HISTORY AND PHYSICAL EXAM    4:01 PM      Date: 7/20/2018  Patient Name: Kirsten Snell    History of Presenting Illness     Chief Complaint   Patient presents with    Chest Pain    Flank Pain    Back Pain         History Provided By: Patient    Chief Complaint: Epigastric pain  Duration:  Months  Timing:  Intermittent  Location: Epigastric abdomen  Quality: Sharp   Severity: Moderate  Modifying Factors:  Associated Symptoms: Nausea      Additional History (Context): Kirsten Snell is a 65 y.o. male with a hx of seizures, an appendectomy, and melanoma who presents with c/o intermittent epigastric pain for the past  several months. He reports associated intermittent nausea. He is not currently nauseated and he rates his pain as a 7-8/10. The pain radiates into his back. He admits to occasional alcohol use and occasional marijuana use. He has been seen for this pain in the past which was when his a-fib was discovered, he was worked up for CAD at that time which was negative. He is on Eliquis for his a-fib. He is also currently on Amoxicillin for an infection of his leg. He denies fever, SOB, chest pain, V/D, and any further complaints. PCP: Sally Johnson MD    Current Outpatient Prescriptions   Medication Sig Dispense Refill    ondansetron (ZOFRAN ODT) 4 mg disintegrating tablet Take 1 Tab by mouth every eight (8) hours as needed for Nausea. 15 Tab 0    oxyCODONE IR (ROXICODONE) 5 mg immediate release tablet Take 1 Tab by mouth every four (4) hours as needed for Pain. Max Daily Amount: 30 mg. 15 Tab 0    omeprazole (PRILOSEC) 20 mg capsule Take 1 Cap by mouth daily for 14 days. 28 Cap 0    ibuprofen (ADVIL) 100 mg tablet Take 100 mg by mouth every six (6) hours as needed for Pain.  phenytoin ER (DILANTIN ER) 100 mg ER capsule Take  by mouth two (2) times a day.  Indications: Unknown amount         Past History     Past Medical History:  Past Medical History:   Diagnosis Date    Cancer ([facility] [address]     melanoma    Other ill-defined conditions(799.89)     Seizure ([facility]     Seizures ([facility] [address] [facility]        Past Surgical History:  Past Surgical History:   Procedure Laterality Date    COLONOSCOPY N/A 3/13/2018    COLONOSCOPY performed by Oliva Clemons MD at [facility] ENDOSCOPY    HX APPENDECTOMY         Family History:  History reviewed. No pertinent family history. Social History:  Social History   Substance Use Topics    Smoking status: Never Smoker    Smokeless tobacco: Never Used    Alcohol use 6.0 oz/week     10 Cans of beer per week       Allergies:  No Known Allergies      Review of Systems     Review of Systems   Constitutional: Negative for chills and fever. HENT: Negative for congestion and sore throat. Respiratory: Negative for cough and shortness of breath. Cardiovascular: Negative for chest pain and leg swelling. Gastrointestinal: Positive for abdominal pain and nausea. Genitourinary: Negative for dysuria and hematuria. Musculoskeletal: Positive for back pain. Skin: Negative for rash and wound. Neurological: Negative for syncope, light-headedness and headaches. Psychiatric/Behavioral: Negative for behavioral problems. The patient is not nervous/anxious. Physical Exam     Visit Vitals    /68    Pulse (!) 103    Temp 97.9 °F (36.6 °C)    Resp 16    Ht 5' 10\" (1.778 m)    Wt 140.6 kg (310 lb)    SpO2 97%    BMI 44.48 kg/m2       Physical Exam   Constitutional: He is oriented to person, place, and time. He appears well-developed and well-nourished. No distress. HENT:   Head: Normocephalic and atraumatic. Mouth/Throat: Oropharynx is clear and moist.   Eyes: Conjunctivae and EOM are normal. Pupils are equal, round, and reactive to light. No scleral icterus. Neck: Normal range of motion. Neck supple. Cardiovascular: Normal rate, regular rhythm and normal heart sounds. No murmur heard.   Pulmonary/Chest: Effort normal and breath sounds normal. No respiratory distress. Abdominal: Soft. Bowel sounds are normal. He exhibits no distension. There is tenderness in the right upper quadrant and epigastric area. Musculoskeletal: He exhibits no edema. Lymphadenopathy:     He has no cervical adenopathy. Neurological: He is alert and oriented to person, place, and time. Coordination normal.   Skin: Skin is warm and dry. No rash noted. Psychiatric: He has a normal mood and affect. His behavior is normal.   Nursing note and vitals reviewed. Diagnostic Study Results     Labs -  No results found for this or any previous visit (from the past 12 hour(s)). Radiologic Studies -   US ABD LTD   Final Result            Medical Decision Making   I am the first provider for this patient. I reviewed the vital signs, available nursing notes, past medical history, past surgical history, family history and social history. Vital Signs-Reviewed the patient's vital signs. Pulse Oximetry Analysis -  96% on room air (Interpretation)WNL    Cardiac Monitor:  Rate: 80 BPM  Rhythm:  Normal Sinus Rhythm     EKG: Interpreted by the EP. Time Interpreted: 1453   Rate: 90 BPM   Rhythm: Atrial Fibrillation    Interpretation: No ST-T wave changes   Comparison:     Records Reviewed: Nursing Notes and Old Medical Records (Time of Review: 4:01 PM)    ED Course: Progress Notes, Reevaluation, and Consults:    Consult:  Discussed care with Dr. Louise Guzman, Specialty: General surgery  Standard discussion; including history of patients chief complaint, available diagnostic results, and treatment course. He will evaluate the patient in the ED.  6:07 PM, 07/20/18     6:09 PM : Pt care transferred to Dr. Jim Brady provider. History of patient complaint(s), available diagnostic reports and current treatment plan has been discussed thoroughly. Bedside rounding on patient occured : yes .   Intended disposition of patient : TBD  Pending diagnostics reports and/or labs (please list): US abdomen    Provider Notes (Medical Decision Making):  MDM  Number of Diagnoses or Management Options  Abdominal pain, right upper quadrant:   Diagnosis management comments: R UQ tenderness intermittent X months worse over last 2 days tender ? Vázquez's sign labs mild leukocytosis care transferred pending US seen by Dr Yarelis Spicer in ED prior to [address] and/or Complexity of Data Reviewed  Clinical lab tests: ordered and reviewed  Tests in the radiology section of CPT®: ordered        Diagnosis     Clinical Impression:   1. Abdominal pain, right upper quadrant        Disposition: Pending    Follow-up Information     Follow up With Details Comments Contact Info    Anita Barthel, MD Schedule an appointment as soon as possible for a visit in 2 days  [facility] [phone]      [facility] DEPT  As needed, If symptoms worsen [facility] [employer] [place] [address] [place] [address]    Lisa Guerra MD Call in 2 days  [place] [address]  [phone]             Discharge Medication List as of 7/20/2018  8:26 PM      START taking these medications    Details   ondansetron (ZOFRAN ODT) 4 mg disintegrating tablet Take 1 Tab by mouth every eight (8) hours as needed for Nausea. , Print, Disp-15 Tab, R-0      oxyCODONE IR (ROXICODONE) 5 mg immediate release tablet Take 1 Tab by mouth every four (4) hours as needed for Pain. Max Daily Amount: 30 mg., Print, Disp-15 Tab, R-0      omeprazole (PRILOSEC) 20 mg capsule Take 1 Cap by mouth daily for 14 days. , Print, Disp-28 Cap, R-0         CONTINUE these medications which have NOT CHANGED    Details   ibuprofen (ADVIL) 100 mg tablet Take 100 mg by mouth every six (6) hours as needed for Pain., Historical Med      phenytoin ER (DILANTIN ER) 100 mg ER capsule Take  by mouth two (2) times a day.  Indications: Unknown amount, Historical Med           _______________________________    Attestations:  [employer] [address] [place] [address] acting as a scribe for and in the presence of Xavi Garvin MD      July 22, 2018 at 7:46 AM       Provider Attestation:      I personally performed the services described in the documentation, reviewed the documentation, as recorded by the scribe in my presence, and it accurately and completely records my words and actions.  July 22, 2018 at 7:46 AM - Xavi Garvin MD    _______________________________

## 2018-07-20 NOTE — ED NOTES
6:00 PM :Pt care assumed from Dr. Sarah Zuluaga , ED provider. Pt complaint(s), current treatment plan, progression and available diagnostic results have been discussed thoroughly. Rounding occurred: yes  Intended Disposition: TBD   Pending diagnostic reports and/or labs (please list): Us and surgery consult    Scribe [id number] acting as a scribe for and in the presence of Maria E Garcia MD      July 20, 2018 at 6:00 PM       Provider Attestation:      I personally performed the services described in the documentation, reviewed the documentation, as recorded by the scribe in my presence, and it accurately and completely records my words and actions.  July 20, 2018 at 6:00 PM -Maria E Garcia MD

## 2018-07-21 LAB
ATRIAL RATE: 220 BPM
CALCULATED R AXIS, ECG10: -21 DEGREES
DIAGNOSIS, 93000: NORMAL
Q-T INTERVAL, ECG07: 384 MS
QRS DURATION, ECG06: 80 MS
QTC CALCULATION (BEZET), ECG08: 469 MS
VENTRICULAR RATE, ECG03: 90 BPM

## 2018-07-21 NOTE — ED NOTES
I have reviewed discharge instructions and meds with the patient. The patient verbalized understanding. Patient armband removed and given to patient to take home.   Patient was informed of the privacy risks if armband lost or stolen

## 2018-07-21 NOTE — ED NOTES
[1900]: I assumed care of this patient from Dr. Evin Emanuel. Patient presented with RIGHT upper quadrant abdominal pain intermittent over the last 2 months, reportedly not associated with food, it has nausea and vomiting association, no diarrhea, he used to be a heavy drinker but is cutting back to 3 times per day now. He was evaluated by Dr. Bossman Mancilla: Gen. surgery and awaiting ultrasound report. Ultrasound per radiology sludge, no other signs of acute cholecystitis    Patient notified me that he is ready to go, he starts persistent pain we'll discharge or pain medication and nausea medication strict return precautions, and treated with Dr. Bossman Mancilla, requests referral to GI as well for upper endoscopy    Patient's presentation, history, physical exam and laboratory evaluations were reviewed. At this time patient was felt to be stable for outpatient management and follow with primary care/specialist.  Patient was instructed to return to the emergency department with any concerns. Disposition:    Discharged home      Portions of this chart were created with Dragon medical speech to text program.   Unrecognized errors may be present.

## 2018-07-21 NOTE — DISCHARGE INSTRUCTIONS

## 2018-07-24 ENCOUNTER — TELEPHONE (OUTPATIENT)
Dept: SURGERY | Age: 58
End: 2018-07-24

## 2018-07-24 ENCOUNTER — OFFICE VISIT (OUTPATIENT)
Dept: SURGERY | Age: 58
End: 2018-07-24

## 2018-07-24 VITALS
HEART RATE: 67 BPM | WEIGHT: 309 LBS | SYSTOLIC BLOOD PRESSURE: 117 MMHG | TEMPERATURE: 96.1 F | HEIGHT: 70 IN | BODY MASS INDEX: 44.24 KG/M2 | DIASTOLIC BLOOD PRESSURE: 68 MMHG | RESPIRATION RATE: 20 BRPM

## 2018-07-24 DIAGNOSIS — K74.60 HEPATIC CIRRHOSIS, UNSPECIFIED HEPATIC CIRRHOSIS TYPE, UNSPECIFIED WHETHER ASCITES PRESENT (HCC): ICD-10-CM

## 2018-07-24 DIAGNOSIS — R10.84 GENERALIZED ABDOMINAL PAIN: ICD-10-CM

## 2018-07-24 DIAGNOSIS — K80.20 GALLSTONES: Primary | ICD-10-CM

## 2018-07-24 NOTE — PROGRESS NOTES
Rico Ladd is a 65 y.o. male who presents today with   Chief Complaint   Patient presents with    Gallbladder Attack     Pt presents today for evaluation of gallbladder, US abd 7/20/2018                1. Have you been to the ER, urgent care clinic since your last visit? Hospitalized since your last visit? No    2. Have you seen or consulted any other health care providers outside of the [address] [place] [address] since your last visit? Include any pap smears or colon screening.  No

## 2018-07-24 NOTE — PATIENT INSTRUCTIONS
If you have any questions or concerns about today's appointment, the verbal and/or written instructions you were given for follow up care, please call our office at [phone].     [facility] [place] [phone] office  [phone]

## 2018-07-25 NOTE — PROGRESS NOTES
General Surgery Consult    Kirsten Snell  Admit date: (Not on file)    MRN: [id number]     : 1960     Age: 65 y.o. Attending Physician: Ramirez Parish MD, [facility]      History of Present Illness:      Kirsten Snell is a 65 y.o. male who presented with abdominal pain. In the pain is diffuse, but mainly in the upper abdomen. The patient has a significant history of the alcohol intake and he has a history of liver cirrhosis with ascites. He even had some evidence of portal hypertension seen on CT scan. An ultrasound was done and showed gallstones but no evidence of cholecystitis. He was referred to me for evaluation. The patient said that his pain is almost constant and is not related to eating fatty food. Patient is also complaining of bloating and abdominal distention. He denies any nausea or vomiting. He denies any fever or chills. There are no active problems to display for this patient. Past Medical History:   Diagnosis Date    Cancer [facility])     melanoma    Other ill-defined conditions(799.89)     Seizure ([address]     Seizures ([address]       Past Surgical History:   Procedure Laterality Date    COLONOSCOPY N/A 3/13/2018    [facility] at [facility]COPY    HX APPENDECTOMY        Social History   Substance Use Topics    Smoking status: Never Smoker    Smokeless tobacco: Never Used    Alcohol use 6.0 oz/week     10 Cans of beer per week      History   Smoking Status    Never Smoker   Smokeless Tobacco    Never Used     Family History   Problem Relation Age of Onset    Stroke Mother     No Known Problems Father       Current Outpatient Prescriptions   Medication Sig    ondansetron (ZOFRAN ODT) 4 mg disintegrating tablet Take 1 Tab by mouth every eight (8) hours as needed for Nausea.  oxyCODONE IR (ROXICODONE) 5 mg immediate release tablet Take 1 Tab by mouth every four (4) hours as needed for Pain.  Max Daily Amount: 30 mg.    omeprazole (PRILOSEC) 20 mg capsule Take 1 Cap by mouth daily for 14 days.  ibuprofen (ADVIL) 100 mg tablet Take 100 mg by mouth every six (6) hours as needed for Pain.  phenytoin ER (DILANTIN ER) 100 mg ER capsule Take  by mouth two (2) times a day. Indications: Unknown amount     No current facility-administered medications for this visit. No Known Allergies     Review of Systems:  Constitutional: negative  Eyes: negative  Ears, Nose, Mouth, Throat, and Face: negative  Respiratory: negative  Cardiovascular: negative  Gastrointestinal: positive for abdominal pain and bbloating and distention  Genitourinary:negative  Integument/Breast: negative  Hematologic/Lymphatic: negative  Musculoskeletal:negative  Neurological: negative  Behavioral/Psychiatric: negative  Endocrine: negative  Allergic/Immunologic: negative    Objective:     Visit Vitals    /68 (BP 1 Location: Left arm, BP Patient Position: At rest)    Pulse 67    Temp 96.1 °F (35.6 °C) (Oral)    Resp 20    Ht 5' 10\" (1.778 m)    Wt 140.2 kg (309 lb)    BMI 44.34 kg/m2       Physical Exam:      General:  in no apparent distress, in no respiratory distress and acyanotic, alert, oriented times 3, afebrile and normal vitals   Eyes:  conjunctivae and sclerae normal, pupils equal, round, reactive to light   Throat & Neck: no erythema or exudates noted and neck supple and symmetrical; no palpable masses   Lungs:   clear to auscultation bilaterally   Heart:  Regular rate and rhythm   Abdomen:   rounded, obese, protuberant and distended, soft, nontender, nondistended, no masses or organomegaly. No abdominal wall hernias.     Extremities: extremities normal, atraumatic, no cyanosis or edema   Skin: Normal.       Imaging and Lab Review:     CBC:   Lab Results   Component Value Date/Time    WBC 16.0 (H) 07/20/2018 03:06 PM    RBC 5.00 07/20/2018 03:06 PM    HGB 15.8 07/20/2018 03:06 PM    HCT 45.6 07/20/2018 03:06 PM    PLATELET 093 (H) 17/40/9917 03:06 PM     BMP:   Lab Results   Component Value Date/Time    Glucose 128 (H) 07/20/2018 03:06 PM    Sodium 138 07/20/2018 03:06 PM    Potassium 4.5 07/20/2018 03:06 PM    Chloride 105 07/20/2018 03:06 PM    CO2 27 07/20/2018 03:06 PM    BUN 7 07/20/2018 03:06 PM    Creatinine 0.62 07/20/2018 03:06 PM    Calcium 8.3 (L) 07/20/2018 03:06 PM     CMP:  Lab Results   Component Value Date/Time    Glucose 128 (H) 07/20/2018 03:06 PM    Sodium 138 07/20/2018 03:06 PM    Potassium 4.5 07/20/2018 03:06 PM    Chloride 105 07/20/2018 03:06 PM    CO2 27 07/20/2018 03:06 PM    BUN 7 07/20/2018 03:06 PM    Creatinine 0.62 07/20/2018 03:06 PM    Calcium 8.3 (L) 07/20/2018 03:06 PM    Anion gap 6 07/20/2018 03:06 PM    BUN/Creatinine ratio 11 (L) 07/20/2018 03:06 PM    Alk. phosphatase 132 (H) 07/20/2018 03:06 PM    Protein, total 7.6 07/20/2018 03:06 PM    Albumin 2.7 (L) 07/20/2018 03:06 PM    Globulin 4.9 (H) 07/20/2018 03:06 PM    A-G Ratio 0.6 (L) 07/20/2018 03:06 PM       No results found for this or any previous visit (from the past 24 hour(s)). images and reports reviewed    Assessment:   [facility] is a 65 y.o. male is presenting with abdominal pain. I do not believe that his pain is related to his cholelithiasis. Also the patient liver cirrhosis place him at increase risk of complication. I explained to the patient that he needs to abstain from drinking alcohol. I will order a HIDA scan. Patient will need to follow up with the GI team and his primary care physician and we'll try to avoid surgery as much as possible.      Plan:     Abstain from drinking alcohol  I would order a HIDA scan  Follow up with GI and primary team  Follow up with me after that result    Please call me if you have any questions (cell phone: [phone])     Signed By: Samuel Garnett MD     July 25, 2018

## 2018-07-26 ENCOUNTER — TELEPHONE (OUTPATIENT)
Dept: SURGERY | Age: 58
End: 2018-07-26

## 2018-07-26 NOTE — TELEPHONE ENCOUNTER
Received call from patient stating that he has not heard anything regarding test that Dr. Se Allen ordered. After reviewing chart I called patient to notify him that HIDA scan has been ordered and that central scheduling will be calling him to schedule. There was no answer so VM was left.

## 2018-07-27 ENCOUNTER — HOSPITAL ENCOUNTER (OUTPATIENT)
Dept: NUCLEAR MEDICINE | Age: 58
Discharge: HOME | End: 2018-07-27
Attending: SURGERY
Payer: COMMERCIAL

## 2018-07-27 DIAGNOSIS — K80.20 GALLSTONES: ICD-10-CM

## 2018-07-27 PROCEDURE — 78226 HEPATOBILIARY SYSTEM IMAGING: CPT

## 2018-07-30 ENCOUNTER — OFFICE VISIT (OUTPATIENT)
Dept: CARDIOLOGY CLINIC | Age: 58
End: 2018-07-30

## 2018-07-30 ENCOUNTER — TELEPHONE (OUTPATIENT)
Dept: SURGERY | Age: 58
End: 2018-07-30

## 2018-07-30 VITALS
WEIGHT: 312 LBS | HEIGHT: 71 IN | DIASTOLIC BLOOD PRESSURE: 72 MMHG | BODY MASS INDEX: 43.68 KG/M2 | SYSTOLIC BLOOD PRESSURE: 103 MMHG | HEART RATE: 82 BPM | OXYGEN SATURATION: 96 %

## 2018-07-30 DIAGNOSIS — I48.91 ATRIAL FIBRILLATION, UNSPECIFIED TYPE (HCC): Primary | ICD-10-CM

## 2018-07-30 DIAGNOSIS — I48.0 PAROXYSMAL ATRIAL FIBRILLATION (HCC): ICD-10-CM

## 2018-07-30 DIAGNOSIS — R93.1 ABNORMAL NUCLEAR CARDIAC IMAGING TEST: ICD-10-CM

## 2018-07-30 PROBLEM — E66.01 OBESITY, MORBID (HCC): Status: ACTIVE | Noted: 2018-07-30

## 2018-07-30 NOTE — MR AVS SNAPSHOT
[address] [facility], [place] 
 
 
 [address] [place] [address] [id number] 
[phone] Patient: Caitlyn Villasenor MRN: [id number] Samantha Dent Visit Information Date & Time Provider Department Dept. Phone Encounter #  
 7/30/2018  2:15 PM John Nelson MD [facility] [place] [phone] Follow-up Instructions Return in about 3 weeks (around 8/20/2018). Your Appointments 8/27/2018  3:15 PM  
Follow Up with John Nelson MD  
Cardio Specialist at [facility] [address] [facility]) Appt Note: after holter [address] [address]  
  
   
 [address] [facility] [address] Upcoming Health Maintenance Date Due Hepatitis C Screening 1960 DTaP/Tdap/Td series (1 - Tdap) 8/31/1982 PAP AKA CERVICAL CYTOLOGY 8/31/1982 BREAST CANCER SCRN MAMMOGRAM 8/31/2011 FOBT Q 1 YEAR AGE 50-75 8/31/2011 Influenza Age 5 to Adult 8/1/2018 Allergies as of 7/30/2018  Review Complete On: 7/30/2018 By: Hector Bess LPN No Known Allergies Current Immunizations  Never Reviewed No immunizations on file. Not reviewed this visit You Were Diagnosed With   
  
 Codes Comments Atrial fibrillation, unspecified type ([facility]    -  Primary ICD-10-CM: I48.91 
ICD-9-CM: 427.31 Vitals BP Pulse Height(growth percentile) Weight(growth percentile) SpO2 BMI  
 103/72 (BP 1 Location: Left arm, BP Patient Position: Sitting) 82 5' 11\" (1.803 m) 312 lb (141.5 kg) 96% 43.52 kg/m2 Smoking Status Never Smoker Vitals History BMI and BSA Data Body Mass Index Body Surface Area  
 43.52 kg/m 2 2.66 m 2 Preferred Pharmacy Pharmacy Name Phone [facility] Your Updated Medication List  
  
   
 This list is accurate as of 7/30/18  3:10 PM.  Always use your most recent med list.  
  
  
  
  
 DILANTIN PO Take  by mouth. We Performed the Following AMB POC EKG ROUTINE W/ 12 LEADS, INTER & REP [42303 CPT(R)] Follow-up Instructions Return in about 3 weeks (around 8/20/2018). Patient Instructions Nexium OTC for 2 weeks Aspirin 81mg Daily Mel Varma will call to schedule your testing within 24 hours. If you do not hear from her, then please call her directly at [phone]. Introducing [facility]! [employer] introduces [employer] patient portal. Now you can access parts of your medical record, email your doctor's office, and request medication refills online. 1. In your internet browser, go to [email or web address] 2. Click on the First Time User? Click Here link in the Sign In box. You will see the New Member Sign Up page. 3. Enter your [employer] Access Code exactly as it appears below. You will not need to use this code after youve completed the sign-up process. If you do not sign up before the expiration date, you must request a new code. · [employer] Access Code: [email or web address] Expires: 10/22/2018  8:45 AM 
 
4. Enter the last four digits of your Social Security Number (xxxx) and Date of Birth (mm/dd/yyyy) as indicated and click Submit. You will be taken to the next sign-up page. 5. Create a [employer] ID. This will be your [employer] login ID and cannot be changed, so think of one that is secure and easy to remember. 6. Create a [employer] password. You can change your password at any time. 7. Enter your Password Reset Question and Answer. This can be used at a later time if you forget your password. 8. Enter your e-mail address. You will receive e-mail notification when new information is available in [address] 9. Click Sign Up. You can now view and download portions of your medical record. 10. Click the Download Summary menu link to download a portable copy of your medical information. If you have questions, please visit the Frequently Asked Questions section of the [employer] website. Remember, [employer] is NOT to be used for urgent needs. For medical emergencies, dial 911. Now available from your iPhone and Android! Please provide this summary of care documentation to your next provider. If you have any questions after today's visit, please call [phone].

## 2018-07-30 NOTE — TELEPHONE ENCOUNTER
Left voice mail message for Mr. Contreras Harris to contact our office to schedule an appointment with Dr. Corby Whitmore re: LYNN hanna/treatment plan.

## 2018-07-30 NOTE — PROGRESS NOTES
Identified pt with two pt identifiers(name and ). 1. Have you been to the ER, urgent care clinic since your last visit? Hospitalized since your last visit? NP _ [facility] for right leg 2. Have you seen or consulted any other health care providers outside of the [address] [place] [address] since your last visit? Include any pap smears or colon screening. NP_ NO Please see Red banners under Allergies, Med rec, Immunizations to remove outside inquires. All correct information has been verified with patient and added to chart. Verbal order and read back per Dr Corinne Ivanoff. Remove all confirmed medications not taking. Medication's patient's would liked removed:   None Pharmacy confirmed[facility]  [facility]

## 2018-08-06 ENCOUNTER — TELEPHONE (OUTPATIENT)
Dept: SURGERY | Age: 58
End: 2018-08-06

## 2018-08-15 PROBLEM — G40.909 EPILEPSY (HCC): Status: ACTIVE | Noted: 2018-08-15

## 2018-08-15 PROBLEM — R93.1 ABNORMAL NUCLEAR CARDIAC IMAGING TEST: Status: ACTIVE | Noted: 2018-08-15

## 2018-08-15 PROBLEM — I48.0 PAROXYSMAL ATRIAL FIBRILLATION (HCC): Status: ACTIVE | Noted: 2018-08-15

## 2018-08-15 NOTE — PROGRESS NOTES
Subjective:      Ermias Soliz is seen in the office today for cardiac evaluation. He is a 65 y.o. man that was diagnosed with atrial fibrillation earlier this year. He was hospitalized at [facility] with atrial fibrillation in February of this year. During the course of his hospitalization, he had an echocardiogram which demonstrated an ejection fraction of 50%. There was no significant valvular pathology. He also had a nuclear perfusion cardiac stress test. The perfusion portion of the exam was normal. His ejection fraction was 43%. The patient was felt to have a [id number] score of 0-1. An anticoagulant was not prescribed. Additionally, the patient has a history of excessive alcohol intake in the past.     The patient in the office today says that he has some episodic abdominal pain which has been occurring since the early part of this year. The discomfort is a back to front jabbing type pain. He said he has taken OxyContin for it in the past at times which does not relieve it. He has had a recent HIDA scan although the report is not available at present. He says that gallbladder did not visualize. The patient has had no palpitations, PND, orthopnea. He sleeps on one pillow. He denies chest pain. 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 Patient Active Problem List    Diagnosis Date Noted    Paroxysmal atrial fibrillation ([facility] 08/15/2018    Abnormal nuclear cardiac imaging test 08/15/2018    [facility] 08/15/2018    Obesity, morbid ([facility] 07/30/2018     Current Outpatient Prescriptions   Medication Sig Dispense Refill    phenytoin sodium extended (DILANTIN PO) Take  by mouth.  ondansetron (ZOFRAN ODT) 4 mg disintegrating tablet Take 1 Tab by mouth every eight (8) hours as needed for Nausea. 15 Tab 0    oxyCODONE IR (ROXICODONE) 5 mg immediate release tablet Take 1 Tab by mouth every four (4) hours as needed for Pain.  Max Daily Amount: 30 mg. 15 Tab 0    ibuprofen (ADVIL) 100 mg tablet Take 100 mg by mouth every six (6) hours as needed for Pain.  phenytoin ER (DILANTIN ER) 100 mg ER capsule Take  by mouth two (2) times a day. Indications: Unknown amount       No Known Allergies  Past Medical History:   Diagnosis Date    Cancer ([address]     melanoma    Other ill-defined conditions(799.89)     Seizure ([address]     Seizures ([address]      Past Surgical History:   Procedure Laterality Date    COLONOSCOPY N/A 3/13/2018    COLON[facility] performed by Alcides [facility]ush [facility] at [facility] ENDOSCOPY    HX APPENDECTOMY       Family History   Problem Relation Age of Onset    Stroke Mother     No Known Problems Father      History   Smoking Status    Never Smoker   Smokeless Tobacco    Never Used          Review of Systems, additional:  Constitutional: negative  Eyes: negative  Respiratory: negative  Cardiovascular: negative  Gastrointestinal: positive for abdominal pain  Musculoskeletal:negative  Neurological: negative  Behvioral/Psych: negative  Endocrine: negative  ENT: negative    Objective:     Visit Vitals    /72 (BP 1 Location: Left arm, BP Patient Position: Sitting)    Pulse 82    Ht 5' 11\" (1.803 m)    Wt 312 lb (141.5 kg)    SpO2 96%    BMI 43.52 kg/m2     General:  alert, cooperative, no distress   Chest Wall: inspection normal - no chest wall deformities or tenderness, respiratory effort normal   Lung: clear to auscultation bilaterally   Heart:  irregularly irregular rhythm with rate 80, no JVD   Abdomen: soft, non-tender. Bowel sounds normal. No masses,  no organomegaly   Extremities: extremities normal, atraumatic, no cyanosis or edema Skin: no rashes   Neuro: alert, oriented, normal speech, no focal findings or movement disorder noted     EK2018; Atrial fibrillation. LAD. Diffuse nondiagnostic ST & T wave abnormalities. Assessment/Plan:       ICD-10-CM ICD-9-CM    1. Atrial fibrillation, unspecified type ([address] [id number] score 0.  Will order holter monitor to determine rate control. Based on score, anticoagulants not indicated. Will see in RT in 3 weeks. I48.91 427.31 AMB POC EKG ROUTINE W/ 12 LEADS, INTER & REP      CARDIAC HOLTER MONITOR   2. Paroxysmal atrial fibrillation (HCC) I48.0 427.31           4. Abnormal nuclear cardiac imaging test, normal perfusion but EF 43%. Echo EF at that time was 50%.   R93.1 794.39

## 2018-08-22 ENCOUNTER — TELEPHONE (OUTPATIENT)
Dept: SURGERY | Age: 58
End: 2018-08-22

## 2018-08-22 NOTE — TELEPHONE ENCOUNTER
Spoke with Mr. Preet Waddell to schedule a follow up appointment with Dr. Ashley Edwards re: HIDA scan results.

## 2018-12-28 ENCOUNTER — HOSPITAL ENCOUNTER (OUTPATIENT)
Dept: MRI IMAGING | Age: 58
Discharge: HOME | End: 2018-12-28
Attending: PODIATRIST
Payer: COMMERCIAL

## 2018-12-28 DIAGNOSIS — R60.0 LOCALIZED EDEMA: ICD-10-CM

## 2018-12-28 DIAGNOSIS — M19.90 OSTEOARTHRITIS, UNSPECIFIED OSTEOARTHRITIS TYPE, UNSPECIFIED SITE: ICD-10-CM

## 2018-12-28 DIAGNOSIS — M77.9 ENTHESITIS: ICD-10-CM

## 2018-12-28 PROCEDURE — 73721 MRI JNT OF LWR EXTRE W/O DYE: CPT

## 2019-01-24 ENCOUNTER — TELEPHONE (OUTPATIENT)
Dept: SURGERY | Age: 59
End: 2019-01-24

## 2019-01-24 NOTE — TELEPHONE ENCOUNTER
Left voice mail message to contact our office to schedule an appointment per referral received from Dr. Sakshi Hager for an evaluation of abnormal HIDA scan.

## 2019-01-30 ENCOUNTER — OFFICE VISIT (OUTPATIENT)
Dept: SURGERY | Age: 59
End: 2019-01-30

## 2019-01-30 VITALS
WEIGHT: 315 LBS | HEART RATE: 96 BPM | OXYGEN SATURATION: 94 % | DIASTOLIC BLOOD PRESSURE: 93 MMHG | SYSTOLIC BLOOD PRESSURE: 138 MMHG | TEMPERATURE: 97.3 F | BODY MASS INDEX: 44.1 KG/M2 | HEIGHT: 71 IN

## 2019-01-30 DIAGNOSIS — K80.20 GALLSTONES: Primary | ICD-10-CM

## 2019-01-30 DIAGNOSIS — R10.11 RIGHT UPPER QUADRANT ABDOMINAL PAIN: ICD-10-CM

## 2019-01-30 NOTE — PROGRESS NOTES
General Surgery Consult Emily Knapp  Admit date: (Not on file) MRN: [id number]     : 1960     Age: 63 y.o. Attending Physician: Perla Flannery MD, [facility] History of Present Illness:  
  
Emily Knapp is a 63 y.o. male who is known to me. I have seen the patient one time before on July of last year. At that point the patient presented with abdominal pain that was diffuse but mainly localized in the right upper quadrant. There was suspicion that this is related to his gallbladder and gallstones however the patient has a history of alcoholism and liver cirrhosis. There was also evidence of portal hypertension on imaging studies. Giving these facts we decided not to proceed with the surgery because of the high risk of complication. A HIDA scan was performed in the meanwhile and it showed evidence of acute cholecystitis with non-visualization of the gallbladder. The patient is continuing to have right upper quadrant pain though he said sometimes he is pain-free. The patient is being followed by the primary care and GI team and he was seen recently by Dr. Mayelin Crockett. Giving the fact that his HIDA scan showed acute cholecystitis he was referred to me again for evaluation for cholecystectomy. The patient is still complaining of bloating and distention. He denies any fever or chills. He stated that he has stopped drinking alcohol for 2 months, however he told my nurse that he needed to cut back on the alcohol intake and he did not stop it. Patient Active Problem List  
 Diagnosis Date Noted  Paroxysmal atrial fibrillation ([address] 08/15/2018  Abnormal nuclear cardiac imaging test 08/15/2018  Epilepsy ([address] 08/15/2018  Obesity, morbid ([address] 2018 Past Medical History:  
Diagnosis Date  Cancer ([address] melanoma  Other ill-defined conditions(799.89)  Seizure ([address]  Seizures ([address] Past Surgical History:  
Procedure Laterality Date  COLONOSCOPY N/A 3/13/2018 COLONOSCOPY performed by Jewell Anderson MD at [address] Social History Tobacco Use  Smoking status: Never Smoker  Smokeless tobacco: Never Used Substance Use Topics  Alcohol use: Yes Alcohol/week: 6.0 oz Types: 10 Cans of beer per week Social History Tobacco Use Smoking Status Never Smoker Smokeless Tobacco Never Used Family History Problem Relation Age of Onset  Stroke Mother  No Known Problems Father Current Outpatient Medications Medication Sig  phenytoin sodium extended (DILANTIN PO) Take  by mouth.  ondansetron (ZOFRAN ODT) 4 mg disintegrating tablet Take 1 Tab by mouth every eight (8) hours as needed for Nausea.  ibuprofen (ADVIL) 100 mg tablet Take 100 mg by mouth every six (6) hours as needed for Pain.  oxyCODONE IR (ROXICODONE) 5 mg immediate release tablet Take 1 Tab by mouth every four (4) hours as needed for Pain. Max Daily Amount: 30 mg.  phenytoin ER (DILANTIN ER) 100 mg ER capsule Take  by mouth two (2) times a day. Indications: Unknown amount No current facility-administered medications for this visit. No Known Allergies Review of Systems: 
Pertinent items are noted in the History of Present Illness. Objective:  
 
Visit Vitals BP (!) 138/93 (BP Patient Position: Sitting) Pulse 96 Temp 97.3 °F (36.3 °C) (Oral) Ht 5' 11\" (1.803 m) Wt 155.1 kg (342 lb) SpO2 94% BMI 47.70 kg/m² Physical Exam:   
 
General:  in no apparent distress, alert, oriented times 3 and cooperative Eyes:  conjunctivae and sclerae normal, pupils equal, round, reactive to light Throat & Neck: no erythema or exudates noted and neck supple and symmetrical; no palpable masses Lungs:   clear to auscultation bilaterally Heart:  Regular rate and rhythm Abdomen:   rounded, obese and protuberant, soft, nontender, nondistended, no masses or organomegaly. No evidence of abdominal wall hernias. Extremities: extremities normal, atraumatic, no cyanosis or edema Skin: Normal.  
   
Imaging and Lab Review: CBC:  
Lab Results Component Value Date/Time WBC 16.0 (H) 07/20/2018 03:06 PM  
 RBC 5.00 07/20/2018 03:06 PM  
 HGB 15.8 07/20/2018 03:06 PM  
 HCT 45.6 07/20/2018 03:06 PM  
 PLATELET 957 (H) 93/79/0621 03:06 PM  
 
BMP:  
Lab Results Component Value Date/Time Glucose 128 (H) 07/20/2018 03:06 PM  
 Sodium 138 07/20/2018 03:06 PM  
 Potassium 4.5 07/20/2018 03:06 PM  
 Chloride 105 07/20/2018 03:06 PM  
 CO2 27 07/20/2018 03:06 PM  
 BUN 7 07/20/2018 03:06 PM  
 Creatinine 0.62 07/20/2018 03:06 PM  
 Calcium 8.3 (L) 07/20/2018 03:06 PM  
 
CMP: 
Lab Results Component Value Date/Time Glucose 128 (H) 07/20/2018 03:06 PM  
 Sodium 138 07/20/2018 03:06 PM  
 Potassium 4.5 07/20/2018 03:06 PM  
 Chloride 105 07/20/2018 03:06 PM  
 CO2 27 07/20/2018 03:06 PM  
 BUN 7 07/20/2018 03:06 PM  
 Creatinine 0.62 07/20/2018 03:06 PM  
 Calcium 8.3 (L) 07/20/2018 03:06 PM  
 Anion gap 6 07/20/2018 03:06 PM  
 BUN/Creatinine ratio 11 (L) 07/20/2018 03:06 PM  
 Alk. phosphatase 132 (H) 07/20/2018 03:06 PM  
 Protein, total 7.6 07/20/2018 03:06 PM  
 Albumin 2.7 (L) 07/20/2018 03:06 PM  
 Globulin 4.9 (H) 07/20/2018 03:06 PM  
 A-G Ratio 0.6 (L) 07/20/2018 03:06 PM  
 
 
No results found for this or any previous visit (from the past 24 hour(s)). images and reports reviewed Assessment:  
Humberto San is a 63 y.o. male is presenting with a very difficult presentation. He has a history of alcoholism with liver cirrhosis as well as some evidence of portal hypertension which put him at extremely high risk of complication from general anesthesia however he has. Right upper quadrant pain with a HIDA scan that was positive for acute cholecystitis I explained to the patient the risk of the surgery including death.   I also explained to him the risk of leakage of ascitic fluid from the trocar site, as well as bleeding among other complications. I also advised the patient that because he is currently asymptomatic, to probably wait on the surgery. Though I explained to him again that his HIDA scan is positive and this could put him at risk of severe infection. I explained to the patient that apically and legally we have an indication for the cholecystectomy but I believe that his HIDA scan is not 100% accurate, and I do believe giving the fact that he has sign of portal hypertension on the CT scan and the fact that he is not always symptomatic that it is better to wait. The patient did agree with this plan. Plan:  
 
Continue with abstinence from alcohol for at least 3-4 months Weight loss Improve his liver enzymes Consider MRCP Follow-up with me in 3-6 months Please call me if you have any questions (cell phone: [phone]) Signed By: Elfego Mead MD   
 January 30, 2019

## 2019-01-30 NOTE — PATIENT INSTRUCTIONS
If you have any questions or concerns about today's appointment, the verbal and/or written instructions you were given for follow up care, please call our office at [phone]. [facility] - [address], [place] [phone] office [phone]

## 2019-01-30 NOTE — PROGRESS NOTES
Chief Complaint Patient presents with  Follow-up  
  gallstone s Patient reports that he has been to GI and has been referred back to have a cholecystectomy. He reports pain and discomfort. 1. Have you been to the ER, urgent care clinic since your last visit? Hospitalized since your last visit? No 
 
2. Have you seen or consulted any other health care providers outside of the [address] [place] [address] since your last visit? Include any pap smears or colon screening.  No

## 2019-08-19 ENCOUNTER — OFFICE VISIT (OUTPATIENT)
Dept: CARDIOLOGY CLINIC | Age: 59
End: 2019-08-19

## 2019-08-19 VITALS
HEIGHT: 71 IN | OXYGEN SATURATION: 94 % | HEART RATE: 83 BPM | BODY MASS INDEX: 44.1 KG/M2 | DIASTOLIC BLOOD PRESSURE: 85 MMHG | WEIGHT: 315 LBS | SYSTOLIC BLOOD PRESSURE: 136 MMHG

## 2019-08-19 RX ORDER — FUROSEMIDE 40 MG/1
TABLET ORAL
COMMUNITY
Start: 2018-07-16

## 2019-08-19 RX ORDER — TAMSULOSIN HYDROCHLORIDE 0.4 MG/1
CAPSULE ORAL
Refills: 1 | COMMUNITY
Start: 2019-07-15

## 2019-08-19 NOTE — PROGRESS NOTES
1. Have you been to the ER, urgent care clinic since your last visit? Hospitalized since your last visit? No    2. Have you seen or consulted any other health care providers outside of the [address] [place] [address] since your last visit? Include any pap smears or colon screening.  No

## 2019-10-25 ENCOUNTER — APPOINTMENT (OUTPATIENT)
Dept: GENERAL RADIOLOGY | Age: 59
End: 2019-10-25
Attending: EMERGENCY MEDICINE
Payer: COMMERCIAL

## 2019-10-25 ENCOUNTER — APPOINTMENT (OUTPATIENT)
Dept: CT IMAGING | Age: 59
End: 2019-10-25
Attending: EMERGENCY MEDICINE
Payer: COMMERCIAL

## 2019-10-25 ENCOUNTER — HOSPITAL ENCOUNTER (EMERGENCY)
Age: 59
Discharge: HOME | End: 2019-10-25
Attending: EMERGENCY MEDICINE | Admitting: EMERGENCY MEDICINE
Payer: COMMERCIAL

## 2019-10-25 VITALS
RESPIRATION RATE: 18 BRPM | BODY MASS INDEX: 47.74 KG/M2 | TEMPERATURE: 98 F | SYSTOLIC BLOOD PRESSURE: 134 MMHG | HEART RATE: 101 BPM | WEIGHT: 315 LBS | HEIGHT: 68 IN | DIASTOLIC BLOOD PRESSURE: 83 MMHG | OXYGEN SATURATION: 98 %

## 2019-10-25 DIAGNOSIS — I48.91 ATRIAL FIBRILLATION WITH RVR (HCC): Primary | ICD-10-CM

## 2019-10-25 LAB
ANION GAP BLD CALC-SCNC: 19 MMOL/L (ref 10–20)
ANION GAP SERPL CALC-SCNC: 4 MMOL/L (ref 3–18)
BASOPHILS # BLD: 0 K/UL (ref 0–0.1)
BASOPHILS NFR BLD: 0 % (ref 0–2)
BNP SERPL-MCNC: 28 PG/ML (ref 0–900)
BUN BLD-MCNC: 4 MG/DL (ref 7–18)
BUN SERPL-MCNC: 6 MG/DL (ref 7–18)
BUN/CREAT SERPL: 8 (ref 12–20)
CA-I BLD-MCNC: 1.14 MMOL/L (ref 1.12–1.32)
CALCIUM SERPL-MCNC: 8.9 MG/DL (ref 8.5–10.1)
CHLORIDE BLD-SCNC: 95 MMOL/L (ref 100–108)
CHLORIDE SERPL-SCNC: 100 MMOL/L (ref 100–111)
CO2 BLD-SCNC: 32 MMOL/L (ref 19–24)
CO2 SERPL-SCNC: 35 MMOL/L (ref 21–32)
CREAT SERPL-MCNC: 0.78 MG/DL (ref 0.6–1.3)
CREAT UR-MCNC: 0.7 MG/DL (ref 0.6–1.3)
D DIMER PPP FEU-MCNC: 0.61 UG/ML(FEU)
DIFFERENTIAL METHOD BLD: ABNORMAL
EOSINOPHIL # BLD: 0.3 K/UL (ref 0–0.4)
EOSINOPHIL NFR BLD: 3 % (ref 0–5)
ERYTHROCYTE [DISTWIDTH] IN BLOOD BY AUTOMATED COUNT: 14.8 % (ref 11.6–14.5)
GLUCOSE BLD STRIP.AUTO-MCNC: 182 MG/DL (ref 74–106)
GLUCOSE SERPL-MCNC: 218 MG/DL (ref 74–99)
HCT VFR BLD AUTO: 47.7 % (ref 36–48)
HCT VFR BLD CALC: 49 % (ref 36–49)
HGB BLD-MCNC: 15.8 G/DL (ref 13–16)
HGB BLD-MCNC: 16.7 G/DL (ref 12–16)
LYMPHOCYTES # BLD: 1.7 K/UL (ref 0.9–3.6)
LYMPHOCYTES NFR BLD: 16 % (ref 21–52)
MCH RBC QN AUTO: 30.5 PG (ref 24–34)
MCHC RBC AUTO-ENTMCNC: 33.1 G/DL (ref 31–37)
MCV RBC AUTO: 92.1 FL (ref 74–97)
MONOCYTES # BLD: 1.8 K/UL (ref 0.05–1.2)
MONOCYTES NFR BLD: 17 % (ref 3–10)
NEUTS SEG # BLD: 6.6 K/UL (ref 1.8–8)
NEUTS SEG NFR BLD: 64 % (ref 40–73)
PLATELET # BLD AUTO: 379 K/UL (ref 135–420)
PMV BLD AUTO: 10.2 FL (ref 9.2–11.8)
POTASSIUM BLD-SCNC: 3.4 MMOL/L (ref 3.5–5.5)
POTASSIUM SERPL-SCNC: 3.6 MMOL/L (ref 3.5–5.5)
RBC # BLD AUTO: 5.18 M/UL (ref 4.7–5.5)
SODIUM BLD-SCNC: 140 MMOL/L (ref 136–145)
SODIUM SERPL-SCNC: 139 MMOL/L (ref 136–145)
TROPONIN I SERPL-MCNC: 0.03 NG/ML (ref 0–0.04)
WBC # BLD AUTO: 10.4 K/UL (ref 4.6–13.2)

## 2019-10-25 PROCEDURE — 71275 CT ANGIOGRAPHY CHEST: CPT

## 2019-10-25 PROCEDURE — 71045 X-RAY EXAM CHEST 1 VIEW: CPT

## 2019-10-25 PROCEDURE — 85025 COMPLETE CBC W/AUTO DIFF WBC: CPT

## 2019-10-25 PROCEDURE — 85379 FIBRIN DEGRADATION QUANT: CPT

## 2019-10-25 PROCEDURE — 96374 THER/PROPH/DIAG INJ IV PUSH: CPT

## 2019-10-25 PROCEDURE — 83880 ASSAY OF NATRIURETIC PEPTIDE: CPT

## 2019-10-25 PROCEDURE — 80048 BASIC METABOLIC PNL TOTAL CA: CPT

## 2019-10-25 PROCEDURE — 74011636320 HC RX REV CODE- 636/320: Performed by: EMERGENCY MEDICINE

## 2019-10-25 PROCEDURE — 99285 EMERGENCY DEPT VISIT HI MDM: CPT

## 2019-10-25 PROCEDURE — 93005 ELECTROCARDIOGRAM TRACING: CPT

## 2019-10-25 PROCEDURE — 74011000258 HC RX REV CODE- 258: Performed by: EMERGENCY MEDICINE

## 2019-10-25 PROCEDURE — 80047 BASIC METABLC PNL IONIZED CA: CPT

## 2019-10-25 PROCEDURE — 74011000250 HC RX REV CODE- 250: Performed by: EMERGENCY MEDICINE

## 2019-10-25 PROCEDURE — 84484 ASSAY OF TROPONIN QUANT: CPT

## 2019-10-25 RX ORDER — SODIUM CHLORIDE 9 MG/ML
100 INJECTION, SOLUTION INTRAVENOUS
Status: COMPLETED | OUTPATIENT
Start: 2019-10-25 | End: 2019-10-25

## 2019-10-25 RX ORDER — DILTIAZEM HYDROCHLORIDE 5 MG/ML
20 INJECTION INTRAVENOUS
Status: COMPLETED | OUTPATIENT
Start: 2019-10-25 | End: 2019-10-25

## 2019-10-25 RX ORDER — METOPROLOL TARTRATE 25 MG/1
25 TABLET, FILM COATED ORAL 2 TIMES DAILY
Qty: 60 TAB | Refills: 0 | Status: SHIPPED | OUTPATIENT
Start: 2019-10-25 | End: 2019-11-24

## 2019-10-25 RX ADMIN — SODIUM CHLORIDE 80 ML: 900 INJECTION, SOLUTION INTRAVENOUS at 12:52

## 2019-10-25 RX ADMIN — DILTIAZEM HYDROCHLORIDE 20 MG: 5 INJECTION INTRAVENOUS at 11:22

## 2019-10-25 RX ADMIN — IOPAMIDOL 95 ML: 755 INJECTION, SOLUTION INTRAVENOUS at 12:52

## 2019-10-25 NOTE — DISCHARGE INSTRUCTIONS
Patient Education        Learning About Atrial Fibrillation  What is atrial fibrillation? Atrial fibrillation (say \"AY-tree-celso syi-hkgj-NIJ-shun\") is the most common type of irregular heartbeat (arrhythmia). Normally, the heart beats in a strong, steady rhythm. In atrial fibrillation, a problem with the heart's electrical system causes the two upper parts of the heart (the atria) to quiver, or fibrillate. Your heart rate also may be faster than normal.  Atrial fibrillation can be dangerous because if the heartbeat isn't strong and steady, blood can collect, or pool, in the atria. And pooled blood is more likely to form clots. Clots can travel to the brain, block blood flow, and cause a stroke. Atrial fibrillation can also lead to heart failure. Treatment for atrial fibrillation helps prevent stroke and heart failure. It also helps relieve symptoms. Atrial fibrillation is often caused by another heart problem. It may happen after heart surgery. It may also be caused by other problems, such as an overactive thyroid gland or lung disease. Many people with atrial fibrillation are able to live full and active lives. What are the symptoms? Some people feel symptoms when they have episodes of atrial fibrillation. But other people don't notice any symptoms. If you have symptoms, you may feel:  · A fluttering, racing, or pounding feeling in your chest called palpitations. · Weak or tired. · Dizzy or lightheaded. · Short of breath. · Chest pain. · Confused. You may notice signs of atrial fibrillation when you check your pulse. Your pulse may seem uneven or fast.  What can you expect when you have atrial fibrillation? At first, spells of atrial fibrillation may come on suddenly and last a short time. It may go away on its own or it goes away after treatment. This is called paroxysmal atrial fibrillation. Over time, the spells may last longer and occur more often. They often don't go away on their own.   How is it treated? Treatments can help you feel better and prevent future problems, especially stroke and heart failure. The main types of treatment slow the heart rate, control the heart rhythm, and help prevent stroke. Your treatment will depend on the cause of your atrial fibrillation, your symptoms, and your risk for stroke. · Heart rate treatment. Medicine may be used to slow your heart rate. Your heartbeat may still be irregular. But these medicines keep your heart from beating too fast. They may also help relieve your symptoms. · Heart rhythm treatment. Different treatments may be used to try to stop atrial fibrillation and keep it from returning. They can also relieve symptoms. These treatments include medicine, electrical cardioversion to shock the heart back to a normal rhythm, a procedure called catheter ablation, and heart surgery. · Stroke prevention. You and your doctor can decide how to lower your risk. You may decide to take a blood-thinning medicine called an anticoagulant. How can you live well with it? You can live well and help manage atrial fibrillation by having a heart-healthy lifestyle. This lifestyle may help reduce how often you have episodes of atrial fibrillation. If you are overweight, losing weight can help relieve symptoms. To have a heart-healthy lifestyle:  · Don't smoke. · Eat heart-healthy foods. · Be active. Talk to your doctor about what type and level of exercise is safe for you. · Stay at a healthy weight. Lose weight if you need to. · Avoid alcohol if it triggers symptoms. · Manage other health problems such as high blood pressure, high cholesterol, and diabetes. · Avoid getting sick from the flu. Get a flu shot every year. · Manage stress. Where can you learn more? Go to http://anu-stella.info/. Enter 344-994-8730 in the search box to learn more about \"Learning About Atrial Fibrillation. \"  Current as of: April 9, 2019  Content Version: 12.2  © 5254-9606 [employer], [employer]. Care instructions adapted under license by [employer] (which disclaims liability or warranty for this information). If you have questions about a medical condition or this instruction, always ask your healthcare professional. [address] any warranty or liability for your use of this information.

## 2019-10-25 NOTE — ED PROVIDER NOTES
EMERGENCY DEPARTMENT HISTORY AND PHYSICAL EXAM    10:49 AM      Date: 10/25/2019  Patient Name: Brady Todd    History of Presenting Illness     Chief Complaint   Patient presents with    Shortness of Breath         History Provided By: Patient    Chief Complaint: sob  Duration:  Days  Timing:  Gradual  Location: NA  Quality: NA  Severity: Moderate  Modifying Factors: worse with movement  Associated Symptoms: denies any other associated signs or symptoms      Additional History (Context): Brady Todd is a 63 y.o. male with seizure and afib who presents with shortness of breath. Patient states started about 2 days ago. Has been worse with exertion. Denies any fever or cough. Also reports her breathing worsens when he tries lie flat. Denies any chest pain or palpitations. Has history of chronic right lower extremity swelling which is unchanged. No recent travel or surgery. Denies any tobacco use. Does report prior diagnosis of A. fib but states she is not on any medications for this. No other complaints. .    PCP: Miguel Lawson MD    Current Outpatient Medications   Medication Sig Dispense Refill    metoprolol tartrate (LOPRESSOR) 25 mg tablet Take 1 Tab by mouth two (2) times a day for 30 days. 60 Tab 0    furosemide (LASIX) 40 mg tablet furosemide 40 mg tablet   take 1 tablet by mouth once daily for LOWER EXT EDEMA      tamsulosin (FLOMAX) 0.4 mg capsule take 1-2 capsule by mouth every evening  1    phenytoin sodium extended (DILANTIN PO) Take  by mouth.  phenytoin ER (DILANTIN ER) 100 mg ER capsule Take  by mouth two (2) times a day. Indications: Unknown amount      ibuprofen (ADVIL) 100 mg tablet Take 100 mg by mouth every six (6) hours as needed for Pain.          Past History     Past Medical History:  Past Medical History:   Diagnosis Date    [facility]     Cancer ([facility]     melanoma    Lymphedema     Other ill-defined conditions(799.89)     Seizure ([facility]     Seizures ([address]     Sepsis [facility])        Past Surgical History:  Past Surgical History:   Procedure Laterality Date    COLONOSCOPY N/A 3/13/2018    COLON[facility]COPY performed by Addison Macedo MD at [facility]COPY    HX APPENDECTOMY         Family History:  Family History   Problem Relation Age of Onset    Stroke Mother     No Known Problems Father        Social History:  Social History     Tobacco Use    Smoking status: Never Smoker    Smokeless tobacco: Never Used   Substance Use Topics    Alcohol use: Yes     Alcohol/week: 10.0 standard drinks     Types: 10 Cans of beer per week    Drug use: Yes     Types: Marijuana     Comment: Nightly before sleep last use 3/7/18       Allergies:  No Known Allergies      Review of Systems       Review of Systems   Constitutional: Negative for fever. Respiratory: Positive for shortness of breath. Negative for cough and chest tightness. Cardiovascular: Negative for chest pain and leg swelling. Gastrointestinal: Negative for abdominal pain. All other systems reviewed and are negative. Physical Exam     Visit Vitals  /84   Pulse 99   Temp 98 °F (36.7 °C)   Resp 20   Ht 5' 8\" (1.727 m)   Wt (!) 163.3 kg (360 lb)   SpO2 96%   BMI 54.74 kg/m²         Physical Exam   Constitutional: He is oriented to person, place, and time. He appears well-developed and well-nourished. HENT:   Head: Normocephalic and atraumatic. Neck: Neck supple. No JVD present. Cardiovascular: An irregularly irregular rhythm present. Tachycardia present. Pulmonary/Chest: Effort normal and breath sounds normal. No respiratory distress. He has no wheezes. Abdominal: Soft. He exhibits no distension. There is no tenderness. There is no rebound and no guarding. Musculoskeletal:   No joint tenderness  RLE 2+ edema, LLE 1+   Neurological: He is alert and oriented to person, place, and time. Skin: Skin is warm and dry. No erythema.    Psychiatric: Judgment normal.         Diagnostic Study Results     Labs -  Recent Results (from the past 12 hour(s))   CBC WITH AUTOMATED DIFF    Collection Time: 10/25/19 11:11 AM   Result Value Ref Range    WBC 10.4 4.6 - 13.2 K/uL    RBC 5.18 4.70 - 5.50 M/uL    HGB 15.8 13.0 - 16.0 g/dL    HCT 47.7 36.0 - 48.0 %    MCV 92.1 74.0 - 97.0 FL    MCH 30.5 24.0 - 34.0 PG    MCHC 33.1 31.0 - 37.0 g/dL    RDW 14.8 (H) 11.6 - 14.5 %    PLATELET 476 537 - 886 K/uL    MPV 10.2 9.2 - 11.8 FL    NEUTROPHILS 64 40 - 73 %    LYMPHOCYTES 16 (L) 21 - 52 %    MONOCYTES 17 (H) 3 - 10 %    EOSINOPHILS 3 0 - 5 %    BASOPHILS 0 0 - 2 %    ABS. NEUTROPHILS 6.6 1.8 - 8.0 K/UL    ABS. LYMPHOCYTES 1.7 0.9 - 3.6 K/UL    ABS. MONOCYTES 1.8 (H) 0.05 - 1.2 K/UL    ABS. EOSINOPHILS 0.3 0.0 - 0.4 K/UL    ABS.  BASOPHILS 0.0 0.0 - 0.1 K/UL    DF AUTOMATED     METABOLIC PANEL, BASIC    Collection Time: 10/25/19 11:11 AM   Result Value Ref Range    Sodium 139 136 - 145 mmol/L    Potassium 3.6 3.5 - 5.5 mmol/L    Chloride 100 100 - 111 mmol/L    CO2 35 (H) 21 - 32 mmol/L    Anion gap 4 3.0 - 18 mmol/L    Glucose 218 (H) 74 - 99 mg/dL    BUN 6 (L) 7.0 - 18 MG/DL    Creatinine 0.78 0.6 - 1.3 MG/DL    BUN/Creatinine ratio 8 (L) 12 - 20      GFR est AA >60 >60 ml/min/1.73m2    GFR est non-AA >60 >60 ml/min/1.73m2    Calcium 8.9 8.5 - 10.1 MG/DL   NT-PRO BNP    Collection Time: 10/25/19 11:11 AM   Result Value Ref Range    NT pro-BNP 28 0 - 900 PG/ML   TROPONIN I    Collection Time: 10/25/19 11:11 AM   Result Value Ref Range    Troponin-I, QT 0.03 0.0 - 0.045 NG/ML   D DIMER    Collection Time: 10/25/19 11:11 AM   Result Value Ref Range    D DIMER 0.61 (H) <0.46 ug/ml(FEU)   POC CHEM8    Collection Time: 10/25/19 12:17 PM   Result Value Ref Range    CO2, POC 32 (H) 19 - 24 MMOL/L    Glucose,  (H) 74 - 106 MG/DL    BUN, POC 4 (L) 7 - 18 MG/DL    Creatinine, POC 0.7 0.6 - 1.3 MG/DL    GFRAA, POC >60 >60 ml/min/1.73m2    GFRNA, POC >60 >60 ml/min/1.73m2    Sodium,  136 - 145 MMOL/L    Potassium, POC 3.4 (L) 3.5 - 5.5 MMOL/L    Calcium, ionized (POC) 1.14 1.12 - 1.32 mmol/L    Chloride, POC 95 (L) 100 - 108 MMOL/L    Anion gap, POC 19 10 - 20      Hematocrit, POC 49 36 - 49 %    Hemoglobin, POC 16.7 (H) 12 - 16 G/DL       Radiologic Studies -   CTA CHEST W OR W WO CONT   Final Result   IMPRESSION:      1. Motion artifact degraded study. No visualized central or interlobar   pulmonary embolism. 2.  No acute pulmonary parenchymal process. 3. Hepatic cirrhosis with recanalized umbilical vein, previously demonstrated on   prior right upper quadrant ultrasound. XR CHEST PORT   Final Result   Impression:      No acute cardiopulmonary disease. Medical Decision Making   I am the first provider for this patient. I reviewed the vital signs, available nursing notes, past medical history, past surgical history, family history and social history. Vital Signs-Reviewed the patient's vital signs. Pulse Oximetry Analysis -  94 on room air (Interpretation)[employer] low    Cardiac Monitor:  Rate: 127  Rhythm:  Atrial Fibrillation     EKG: Interpreted by the EP. Time Interpreted:1052    Rate: 124   Rhythm: Atrial Fibrillation    Interpretation: At axis, A. fib with PVCs, no ST changes   Comparison: 7/20/2018, unchanged    Records Reviewed: Nursing Notes, Old Medical Records and Previous electrocardiograms (Time of Review: 10:49 AM)    ED Course: Progress Notes, Reevaluation, and Consults:      Provider Notes (Medical Decision Making): 67-year-old male presenting with shortness of breath with exertion. Noted to be in A. fib with RVR, will treat and check basic labs. Does have a prior history of A. fib. His sats are mildly low so we will add on a d-dimer although he has no risk factors. 12:06 PM  Discussed results with patient. Noted his elevated d-dimer so awaiting a CTA.   Heart rate is now mostly in the 90s to low 100s after single dose of adult.    2:06 PM  Discussed results with patient. His CT was unremarkable and labs are unremarkable. Will trial with ambulation to see his ambulatory sats and heart rate. 2:15 PM  Patient ambulated with heart rate up into the 140s room air sats about 92%. Discussed with patient would recommend admission however he declines so we will consult with cardiology as to outpatient management. Chadsvasc 2 score 0    2:26 PM  Consult with, cardiology PA. Agrees with outpatient management started on metoprolol. Will follow in the office outpatient. Diagnosis     Clinical Impression:   1. Atrial fibrillation with RVR ([facility]        Disposition: discharged    Follow-up Information     Follow up With Specialties Details Why Contact Info    Courtney Magana MD Cardiology, Internal Medicine Schedule an appointment as soon as possible for a visit in 1 week  [address] [facility]  [address]  [phone]      Jhonatan Arce MD Family Practice Schedule an appointment as soon as possible for a visit in 3 days  [address] [facility] EMERGENCY DEPT Emergency Medicine  As needed, If symptoms worsen [address] [phone]           Patient's Medications   Start Taking    METOPROLOL TARTRATE (LOPRESSOR) 25 MG TABLET    Take 1 Tab by mouth two (2) times a day for 30 days. Continue Taking    FUROSEMIDE (LASIX) 40 MG TABLET    furosemide 40 mg tablet   take 1 tablet by mouth once daily for LOWER EXT EDEMA    IBUPROFEN (ADVIL) 100 MG TABLET    Take 100 mg by mouth every six (6) hours as needed for Pain. PHENYTOIN ER (DILANTIN ER) 100 MG ER CAPSULE    Take  by mouth two (2) times a day. Indications: Unknown amount    PHENYTOIN SODIUM EXTENDED (DILANTIN PO)    Take  by mouth.     TAMSULOSIN (FLOMAX) 0.4 MG CAPSULE    take 1-2 capsule by mouth every evening   These Medications have changed    No medications on file   Stop Taking    OXYCODONE IR (ROXICODONE) 5 MG IMMEDIATE RELEASE TABLET    Take 1 Tab by mouth every four (4) hours as needed for Pain.  Max Daily Amount: 30 mg.     _______________________________

## 2019-10-27 LAB
ATRIAL RATE: 153 BPM
CALCULATED R AXIS, ECG10: -20 DEGREES
CALCULATED T AXIS, ECG11: 64 DEGREES
DIAGNOSIS, 93000: NORMAL
Q-T INTERVAL, ECG07: 314 MS
QRS DURATION, ECG06: 72 MS
QTC CALCULATION (BEZET), ECG08: 451 MS
VENTRICULAR RATE, ECG03: 124 BPM

## 2020-01-13 ENCOUNTER — OFFICE VISIT (OUTPATIENT)
Dept: CARDIOLOGY CLINIC | Age: 60
End: 2020-01-13

## 2020-01-13 VITALS
OXYGEN SATURATION: 97 % | WEIGHT: 315 LBS | HEIGHT: 68 IN | HEART RATE: 95 BPM | SYSTOLIC BLOOD PRESSURE: 112 MMHG | DIASTOLIC BLOOD PRESSURE: 67 MMHG | BODY MASS INDEX: 47.74 KG/M2

## 2020-01-13 DIAGNOSIS — I48.0 PAROXYSMAL ATRIAL FIBRILLATION (HCC): Primary | ICD-10-CM

## 2020-01-13 NOTE — PROGRESS NOTES
1. Have you been to the ER, urgent care clinic since your last visit? Hospitalized since your last visit? No     2. Have you seen or consulted any other health care providers outside of the [address] [place] [address] since your last visit? Include any pap smears or colon screening.   No

## 2020-01-17 NOTE — PROGRESS NOTES
Subjective:      Prosper Moreno is seen in the office today for cardiac evaluation. He is a 63 y.o. man that was diagnosed with atrial fibrillation earlier this year. He was hospitalized at [facility] with atrial fibrillation in February of this year. During the course of his hospitalization, he had an echocardiogram which demonstrated an ejection fraction of 50%. There was no significant valvular pathology. He also had a nuclear perfusion cardiac stress test. The perfusion portion of the exam was normal. His ejection fraction was 43%. The patient was felt to have a [id number] score of 0-1. An anticoagulant was not prescribed. Additionally, the patient has a history of excessive alcohol intake in the past.      In the office today says that he was hospitalized at [place] [address] for severe shortness of breath and and swelling. He was started on Eliquis although the patient is  not certain what he medicines he is taking. He describes having a possible sleep study here in the future. He was diagnosed with seizures at some point and issupposed to be taking Dilantin. He reports that he stopped drinking alcohol. He does believe he is taking Lasix twice daily. He is no longer working in [place]. The patient has had no palpitations, PND, orthopnea. He sleeps on one pillow. He denies chest pain. He is quite active in his business of [employer]. Patient Active Problem List    Diagnosis Date Noted    Paroxysmal atrial fibrillation ([facility] 08/15/2018    Abnormal nuclear cardiac imaging test 08/15/2018    Epilepsy ([facility] 08/15/2018    Obesity, morbid (HCC) 07/30/2018     Current Outpatient Medications   Medication Sig Dispense Refill    furosemide (LASIX) 40 mg tablet furosemide 40 mg tablet   take 1 tablet by mouth once daily for LOWER EXT EDEMA      ibuprofen (ADVIL) 100 mg tablet Take 100 mg by mouth every six (6) hours as needed for Pain.       phenytoin ER (DILANTIN ER) 100 mg ER capsule Take  by mouth two (2) times a day. Indications: Unknown amount      tamsulosin (FLOMAX) 0.4 mg capsule take 1-2 capsule by mouth every evening  1     No Known Allergies  Past Medical History:   Diagnosis Date    Atrial fibrillation (HCC)     Cancer (HCC)     melanoma    Lymphedema     Other ill-defined conditions(799.89)     Seizure ([address]     Seizures ([address]     Sepsis ([address]      Past Surgical History:   Procedure Laterality Date    COLONOSCOPY N/A 3/13/2018    COLONOSCOPY performed by Albania Conner MD at [facility] ENDOSCOPY    HX APPENDECTOMY       Family History   Problem Relation Age of Onset    Stroke Mother     No Known Problems Father      Social History     Tobacco Use   Smoking Status Never Smoker   Smokeless Tobacco Never Used          Review of Systems, additional:  Constitutional: negative  Eyes: negative  Respiratory: negative  Cardiovascular: negative  Gastrointestinal: positive for abdominal pain  Musculoskeletal:negative  Neurological: negative  Behvioral/Psych: negative  Endocrine: negative  ENT: negative    Objective:     Visit Vitals  /67   Pulse 95   Ht 5' 8\" (1.727 m)   Wt (!) 159.2 kg (351 lb)   SpO2 97%   BMI 53.37 kg/m²     General:  alert, cooperative, no distress   Chest Wall: inspection normal - no chest wall deformities or tenderness, respiratory effort normal   Lung: clear to auscultation bilaterally   Heart:  irregularly irregular rhythm with rate 80, no JVD   Abdomen: soft, non-tender. Bowel sounds normal. No masses,  no organomegaly   Extremities: extremities normal, atraumatic, no cyanosis or edema Skin: no rashes   Neuro: alert, oriented, normal speech, no focal findings or movement disorder noted     EK2018; Atrial fibrillation. LAD. Diffuse nondiagnostic ST & T wave abnormalities. Assessment/Plan:       ICD-10-CM ICD-9-CM    1. Atrial fibrillation, unspecified type ([address] [id number] score 0.   High risk for anticoagulants related to alcohol in the past. I48.91 427.31 AMB POC EKG ROUTINE W/ 12 LEADS, INTER & REP      CARDIAC HOLTER MONITOR   2. Paroxysmal atrial fibrillation ([address] the patient did not take any medications and did not follow-up after  his last visit. He has been hospitalized at [facility] in the interim with seizures. Discussed need for Holter monitoring for him so that we can look at his rate control. He is rambling on nonsensically. Will make a follow-up appointment in 6 weeks. He will call in the names of his present medicines I48.0 427.31           3. Abnormal nuclear cardiac imaging test, normal perfusion but EF 43%. Echo EF at that time was 50%. R93.1 794.39    4. Seizures, taking Dilantin now.

## 2022-02-03 ENCOUNTER — TELEPHONE (OUTPATIENT)
Dept: CARDIOLOGY CLINIC | Age: 62
End: 2022-02-03

## 2022-02-03 NOTE — TELEPHONE ENCOUNTER
Patient had chest xray done at the office Dr. Dennis De Anda on [address].  Wants to know if we would be able to pull it to go over it at his appt on 02-09 @1:00

## 2022-02-09 ENCOUNTER — OFFICE VISIT (OUTPATIENT)
Dept: CARDIOLOGY CLINIC | Age: 62
End: 2022-02-09

## 2022-02-09 VITALS
DIASTOLIC BLOOD PRESSURE: 74 MMHG | BODY MASS INDEX: 45.61 KG/M2 | SYSTOLIC BLOOD PRESSURE: 111 MMHG | TEMPERATURE: 98.2 F | RESPIRATION RATE: 18 BRPM | OXYGEN SATURATION: 97 % | HEART RATE: 96 BPM | WEIGHT: 300 LBS

## 2022-02-09 DIAGNOSIS — I48.0 PAROXYSMAL ATRIAL FIBRILLATION (HCC): Primary | ICD-10-CM

## 2022-02-09 PROCEDURE — 99214 OFFICE O/P EST MOD 30 MIN: CPT | Performed by: INTERNAL MEDICINE

## 2022-02-09 NOTE — PROGRESS NOTES
Identified pt with two pt identifiers(name and ). Reviewed record in preparation for visit and have obtained necessary documentation. Mariam Gomez presents today for   Chief Complaint   Patient presents with    Follow-up     overdue                 Mariam Gomez preferred language for health care discussion is english/other. Personal Protective Equipment:   Personal Protective Equipment was used including: mask-surgical and hands-gloves. Patient was placed on no precaution(s). Patient was masked. Precautions:   Patient currently on None  Patient currently roomed with door closed. Is someone accompanying this pt? no    Is the patient using any DME equipment during [address]? no    Depression Screening:  3 most recent PHQ Screens 2022   Little interest or pleasure in doing things Not at all   Feeling down, depressed, irritable, or hopeless Not at all   Total Score PHQ 2 0       Learning Assessment:  No flowsheet data found. Abuse Screening:  No flowsheet data found. Fall Risk  No flowsheet data found. Pt currently taking Anticoagulant therapy? ukn  Pt currently taking Antiplatelet therapy? [facility]  Coordination of Care:  1. Have you been to the ER, urgent care clinic since your last visit? Hospitalized since your last visit? yes    2. Have you seen or consulted any other health care providers outside of the [address] [place] [address] since your last visit? Include any pap smears or colon screening. yes      Please see Red banners under Allergies and Med Rec to remove outside inquires. All correct information has been verified with patient and added to chart.      Medication's patient's would liked removed has been marked not taking to be removed per Verbal order and read back per Karla Townsend MD

## 2022-02-25 NOTE — PROGRESS NOTES
Subjective:      Sunitha Yousif is seen in the office today for cardiac evaluation. He is a 62 y.o. man that was hospitalized at [facility] with atrial fibrillation in February of 2019. During the course of his hospitalization, he had an echocardiogram which demonstrated an ejection fraction of 50%. There was no significant valvular pathology. He also had a nuclear perfusion cardiac stress test. The perfusion portion of the exam was normal. His ejection fraction was 43%. The patient was felt to have a [id number] score of 0-1. An anticoagulant was not prescribed. Additionally, the patient has a history of excessive alcohol intake in the past.      In the office today, he reports that he feels \"pretty good \". His breathing has improved. His weight is down 51 pounds from his previous appointment of 1/13/2020. He reports that he has curtailed his alcohol consumption. He has had no chest pain. He denies shortness of breath. Patient Active Problem List    Diagnosis Date Noted    [facility] ([facility] 08/15/2018    Abnormal nuclear cardiac imaging test 08/15/2018    Epilepsy ([facility] 08/15/2018    Obesity, morbid (HCC) 07/30/2018     Current Outpatient Medications   Medication Sig Dispense Refill    furosemide (LASIX) 40 mg tablet furosemide 40 mg tablet   take 1 tablet by mouth once daily for LOWER EXT EDEMA      tamsulosin (FLOMAX) 0.4 mg capsule take 1-2 capsule by mouth every evening  1    ibuprofen (ADVIL) 100 mg tablet Take 100 mg by mouth every six (6) hours as needed for Pain.  phenytoin ER (DILANTIN ER) 100 mg ER capsule Take  by mouth two (2) times a day.  Indications: Unknown amount       No Known Allergies  Past Medical History:   Diagnosis Date    Atrial fibrillation (HCC)     Cancer (HCC)     melanoma    Lymphedema     Other ill-defined conditions(799.89)     [facility]     Seizures ([address]     Sepsis ([facility]      Past Surgical History:   Procedure Laterality Date    COLONOSCOPY N/A 3/13/2018    COLON[facility] performed [facility] Eze [facility]alla [facility] at [facility] ENDOSCOPY    HX APPENDECTOMY       Family History   Problem Relation Age of Onset    Stroke Mother     No Known Problems Father      Social History     Tobacco Use   Smoking Status Never Smoker   Smokeless Tobacco Never Used          Review of Systems, additional:  Constitutional: negative  Eyes: negative  Respiratory: negative  Cardiovascular: negative  Gastrointestinal: positive for abdominal pain  Musculoskeletal:negative  Neurological: negative  Behvioral/Psych: negative  Endocrine: negative  ENT: negative    Objective:     Visit Vitals  /74   Pulse 96   Temp 98.2 °F (36.8 °C) (Temporal)   Resp 18   Wt 136.1 kg (300 lb)   SpO2 97%   BMI 45.61 kg/m²     General:  alert, cooperative, no distress   Chest Wall: inspection normal - no chest wall deformities or tenderness, respiratory effort normal   Lung: clear to auscultation bilaterally   Heart:  irregularly irregular rhythm with rate 80, no JVD   Abdomen: soft, non-tender. Bowel sounds normal. No masses,  no organomegaly   Extremities: extremities normal, atraumatic, no cyanosis or edema Skin: no rashes   Neuro: alert, oriented, normal speech, no focal findings or movement disorder noted     EK2018; Atrial fibrillation. LAD. Diffuse nondiagnostic ST & T wave abnormalities. Assessment/Plan:       ICD-10-CM ICD-9-CM    1. Atrial fibrillation, unspecified type ([address] [id number] score 0. High risk for anticoagulants related to alcohol in the past.  Patient reports reduced alcohol consumption. We will have the patient return to the office in 5 weeks with an echocardiogram to be done the same day. I48.91 427.31 AMB POC EKG ROUTINE W/ 12 LEADS, INTER & REP      CARDIAC HOLTER MONITOR   2. Paroxysmal atrial fibrillation ([address] the patient did not take any medications and did not follow-up after  his initial  visit.  I48.0 427.31 3. Abnormal nuclear cardiac imaging test, normal perfusion with EF 43%. Echo EF at that time was 50%. R93.1 794.39    4. Seizures, taking Dilantin now.

## 2022-03-18 PROBLEM — I48.0 PAROXYSMAL ATRIAL FIBRILLATION (HCC): Status: ACTIVE | Noted: 2018-08-15

## 2022-03-19 PROBLEM — R93.1 ABNORMAL NUCLEAR CARDIAC IMAGING TEST: Status: ACTIVE | Noted: 2018-08-15

## 2022-03-19 PROBLEM — G40.909 EPILEPSY (HCC): Status: ACTIVE | Noted: 2018-08-15

## 2022-03-20 PROBLEM — E66.01 OBESITY, MORBID (HCC): Status: ACTIVE | Noted: 2018-07-30

## 2022-03-29 ENCOUNTER — TELEPHONE (OUTPATIENT)
Dept: CARDIOLOGY CLINIC | Age: 62
End: 2022-03-29

## 2022-03-30 ENCOUNTER — OFFICE VISIT (OUTPATIENT)
Dept: CARDIOLOGY CLINIC | Age: 62
End: 2022-03-30

## 2022-03-30 VITALS
WEIGHT: 312 LBS | DIASTOLIC BLOOD PRESSURE: 79 MMHG | OXYGEN SATURATION: 96 % | BODY MASS INDEX: 47.44 KG/M2 | TEMPERATURE: 98.9 F | SYSTOLIC BLOOD PRESSURE: 133 MMHG | RESPIRATION RATE: 20 BRPM | HEART RATE: 64 BPM

## 2022-03-30 DIAGNOSIS — I48.0 PAROXYSMAL ATRIAL FIBRILLATION (HCC): Primary | ICD-10-CM

## 2022-03-30 PROCEDURE — 99214 OFFICE O/P EST MOD 30 MIN: CPT | Performed by: INTERNAL MEDICINE

## 2022-03-30 RX ORDER — METOPROLOL SUCCINATE 25 MG/1
25 TABLET, EXTENDED RELEASE ORAL DAILY
COMMUNITY
End: 2022-03-30 | Stop reason: SDUPTHER

## 2022-03-30 RX ORDER — GLIMEPIRIDE 1 MG/1
1 TABLET ORAL
COMMUNITY
Start: 2022-03-25

## 2022-03-30 RX ORDER — BUMETANIDE 2 MG/1
2 TABLET ORAL 2 TIMES DAILY
COMMUNITY
Start: 2022-02-16 | End: 2022-10-10 | Stop reason: ALTCHOICE

## 2022-03-30 RX ORDER — METOLAZONE 2.5 MG/1
TABLET ORAL
COMMUNITY
Start: 2022-02-16

## 2022-03-30 NOTE — TELEPHONE ENCOUNTER
PCP: Jacki Allen MD    Last appt: 2/9/2022  Future Appointments   Date Time Provider Saleem Montilla   3/30/2022  2:30 PM Joce Perry MD [address] AMB   6/1/2022  2:30 PM Joce Perry MD [address] AMB       Requested Prescriptions      No prescriptions requested or ordered in this encounter

## 2022-03-30 NOTE — PROGRESS NOTES
Identified pt with two pt identifiers(name and ). Reviewed record in preparation for visit and have obtained necessary documentation. Federico Jones presents today for   Chief Complaint   Patient presents with    Cardiac Testing     echo       Pt c/o SOB and  SWELLING. Federico Jones preferred language for health care discussion is english/other. Personal Protective Equipment:   Personal Protective Equipment was used including: mask-surgical and hands-gloves. Patient was placed on no precaution(s). Patient was masked. Precautions:   Patient currently on None  Patient currently roomed with door closed. Is someone accompanying this pt? no    Is the patient using any DME equipment during [address]? no    Depression Screening:  3 most recent PHQ Screens 3/30/2022   Little interest or pleasure in doing things Not at all   Feeling down, depressed, irritable, or hopeless Not at all   Total Score PHQ 2 0       Learning Assessment:  No flowsheet data found. Abuse Screening:  No flowsheet data found. Fall Risk  No flowsheet data found. Pt currently taking Anticoagulant therapy? no  Pt currently taking Antiplatelet therapy? no    Coordination of Care:  1. Have you been to the ER, urgent care clinic since your last visit? Hospitalized since your last visit? no    2. Have you seen or consulted any other health care providers outside of the [address] [place] [address] since your last visit? Include any pap smears or colon screening. no      Please see Red banners under Allergies and Med Rec to remove outside inquires. All correct information has been verified with patient and added to chart.      Medication's patient's would liked removed has been marked not taking to be removed per Verbal order and read back per Mita Nelson MD

## 2022-03-31 RX ORDER — METOPROLOL SUCCINATE 25 MG/1
25 TABLET, EXTENDED RELEASE ORAL DAILY
Qty: 30 TABLET | Refills: 3 | Status: SHIPPED | OUTPATIENT
Start: 2022-03-31

## 2022-04-11 NOTE — PROGRESS NOTES
Subjective:      Kimmie Lyle is seen in the office today for cardiac evaluation. He is a 62 y.o. man that was hospitalized at [facility] with atrial fibrillation in February of 2019. During the course of his hospitalization, he had an echocardiogram which demonstrated an ejection fraction of 50%. There was no significant valvular pathology. He also had a nuclear perfusion cardiac stress test. The perfusion portion of the exam was normal. His ejection fraction was 43%. The patient was felt to have a [id number] score of 0-1. An anticoagulant was not prescribed. Additionally, the patient has a history of excessive alcohol intake in the past.      In the office today, he reports that he is feeling all right. He has been using supplemental oxygen at night. He is on more diuretics than he has been on the past.               Patient Active Problem List    Diagnosis Date Noted    Paroxysmal atrial fibrillation ([facility] 08/15/2018    Abnormal nuclear cardiac imaging test 08/15/2018    Epilepsy ([facility] 08/15/2018    Obesity, morbid ([facility] 07/30/2018     Current Outpatient Medications   Medication Sig Dispense Refill    ibuprofen (ADVIL) 100 mg tablet Take 100 mg by mouth every six (6) hours as needed for Pain.  metoprolol succinate (Toprol XL) 25 mg XL tablet Take 1 Tablet by mouth daily. 30 Tablet 3    glimepiride (AMARYL) 1 mg tablet       metOLazone (ZAROXOLYN) 2.5 mg tablet take 1 tablet by mouth daily if needed      bumetanide (BUMEX) 2 mg tablet Take 2 mg by mouth two (2) times a day.  furosemide (LASIX) 40 mg tablet furosemide 40 mg tablet   take 1 tablet by mouth once daily for LOWER EXT EDEMA      tamsulosin (FLOMAX) 0.4 mg capsule take 1-2 capsule by mouth every evening  1    phenytoin ER (DILANTIN ER) 100 mg ER capsule Take  by mouth two (2) times a day.  Indications: Unknown amount       No Known Allergies  Past Medical History:   Diagnosis Date    [facility]     Cancer ([facility]     melanoma    Lymphedema     Other ill-defined conditions(799.89)     Seizure ([facility]     Seizures ([facility]     Sepsis ([facility]      Past Surgical History:   Procedure Laterality Date    COLONOSCOPY N/A 3/13/2018    COLONOSCOPY performed by Jordana Martinez MD at [facility] ENDOSCOPY    HX APPENDECTOMY       Family History   Problem Relation Age of Onset    Stroke Mother     No Known Problems Father      Social History     Tobacco Use   Smoking Status Never Smoker   Smokeless Tobacco Never Used          Review of Systems, additional:  Constitutional: negative  Eyes: negative  Respiratory: negative  Cardiovascular: negative  Gastrointestinal: positive for abdominal pain  Musculoskeletal:negative  Neurological: negative  Behvioral/Psych: negative  Endocrine: negative  ENT: negative    Objective:     Visit Vitals  /79   Pulse 64   Temp 98.9 °F (37.2 °C) (Temporal)   Resp 20   Wt 141.5 kg (312 lb)   SpO2 96%   BMI 47.44 kg/m²     General:  alert, cooperative, no distress   Chest Wall: inspection normal - no chest wall deformities or tenderness, respiratory effort normal   Lung: clear to auscultation bilaterally   Heart:  irregularly irregular rhythm with rate 80, no JVD   Abdomen: soft, non-tender. Bowel sounds normal. No masses,  no organomegaly   Extremities: extremities normal, atraumatic, no cyanosis or edema Skin: no rashes   Neuro: alert, oriented, normal speech, no focal findings or movement disorder noted     EK2021. Atrial fibrillation. Right axis deviation. Occasional aberrantly conducted beat versus PVC. Assessment/Plan:       ICD-10-CM ICD-9-CM    1. Atrial fibrillation, unspecified type ([facility] [id number] score 0. High risk for anticoagulants related to alcohol in the past.  Patient reports reduced alcohol consumption. Cardiogram completed on 3/30/2022. EF 55 to 60%. Severely dilated right ventricle. Mitral annular calcification.   Mild dilatation of left atrium. We will add Toprol-XL 25 mg daily. Return in 6 weeks. I48.91 427.31 AMB POC EKG ROUTINE W/ 12 LEADS, INTER & REP      CARDIAC HOLTER MONITOR   2. Paroxysmal atrial fibrillation ([address] the patient did not take any medications in the past.  He will call with a more accurate list of his current medications when he returns home. I48.0 427.31           3. Abnormal nuclear cardiac imaging test, normal perfusion with EF 43%. Echo EF at that time was 50%. R93.1 794.39    4. Seizures, taking Dilantin now.

## 2022-07-08 NOTE — PATIENT INSTRUCTIONS
Nexium OTC for 2 weeks Aspirin 81mg Daily Law Galvan will call to schedule your testing within 24 hours. If you do not hear from her, then please call her directly at [phone]. History reported from [place]. Unlikely related to current odynophagia.    Plan:  - Recommend cardiology f/u as outpatient

## 2022-10-10 ENCOUNTER — OFFICE VISIT (OUTPATIENT)
Dept: CARDIOLOGY CLINIC | Age: 62
End: 2022-10-10

## 2022-10-10 VITALS
WEIGHT: 315 LBS | SYSTOLIC BLOOD PRESSURE: 124 MMHG | TEMPERATURE: 98.1 F | BODY MASS INDEX: 47.9 KG/M2 | HEART RATE: 100 BPM | OXYGEN SATURATION: 97 % | DIASTOLIC BLOOD PRESSURE: 72 MMHG

## 2022-10-10 DIAGNOSIS — E87.6 HYPOKALEMIA: Primary | ICD-10-CM

## 2022-10-10 PROCEDURE — 99214 OFFICE O/P EST MOD 30 MIN: CPT | Performed by: INTERNAL MEDICINE

## 2022-10-10 RX ORDER — APIXABAN 5 MG/1
5 TABLET, FILM COATED ORAL 2 TIMES DAILY
COMMUNITY
Start: 2022-09-26

## 2022-10-10 NOTE — PATIENT INSTRUCTIONS
Labs    BMP   MAG    No appointment required for lab work  [address], [address]  Hours are Mon-Fri 7:00 am-3:30 pm  **closed from 12:30 pm-1pm daily**

## 2022-10-10 NOTE — PROGRESS NOTES
Identified pt with two pt identifiers(name and ). Reviewed record in preparation for visit and have obtained necessary documentation. June Chance presents today for   Chief Complaint   Patient presents with    Follow-up       Pt denies  DIZZINESS, SOB, CHEST PAIN/ PRESSURE, FATIGUE/WEAKNESS, HEADACHES, SWELLING. June Chance preferred language for health care discussion is english/other. Personal Protective Equipment:   Personal Protective Equipment was used including: mask-surgical and hands-gloves. Patient was placed on no precaution(s). Patient was masked. Precautions:   Patient currently on None  Patient currently roomed with door closed. Is someone accompanying this pt? no    Is the patient using any DME equipment during [address]? no    Depression Screening:  3 most recent PHQ Screens 10/10/2022   Little interest or pleasure in doing things Not at all   Feeling down, depressed, irritable, or hopeless Not at all   Total Score PHQ 2 0       Learning Assessment:  No flowsheet data found. Abuse Screening:  No flowsheet data found. Fall Risk  No flowsheet data found. Pt currently taking Anticoagulant therapy? yes  Pt currently taking Antiplatelet therapy? no    Coordination of Care:  1. Have you been to the ER, urgent care clinic since your last visit? Hospitalized since your last visit? Yes. [facility]. 22. Hypokalemia      2. Have you seen or consulted any other health care providers outside of the [address] [place] [address] since your last visit? Include any pap smears or colon screening. yes      Please see Red banners under Allergies and Med Rec to remove outside inquires. All correct information has been verified with patient and added to chart.      Medication's patient's would liked removed has been marked not taking to be removed per Verbal order and read back per Juan Carlos Yan MD

## 2022-10-17 NOTE — PROGRESS NOTES
Subjective:      Angela Luke is seen in the office today for cardiac reevaluation. He is a 65 y.o. man that was hospitalized at [facility] with atrial fibrillation in February of 2019. During the course of his hospitalization, he had an echocardiogram which demonstrated an ejection fraction of 50%. There was no significant valvular pathology. He also had a nuclear perfusion cardiac stress test. The perfusion portion of the exam was normal. His ejection fraction was 43%. In the past, the patient was felt to have a [id number] score of 0-1. An anticoagulant was not prescribed. Additionally, the patient has a history of long history of excessive alcohol intake. In the office today, he reports a recent [facility] evaluation (8/1/2022). He presented with an altered mental status. He reports that he had 24 hours of nausea and vomiting and loose stools prior to the hospital arrival.  He has no shortness of breath now. He believes his breathing is improved. He has had no chest pain. Patient Active Problem List    Diagnosis Date Noted    Paroxysmal atrial fibrillation ([facility] 08/15/2018    Abnormal nuclear cardiac imaging test 08/15/2018    Epilepsy ([facility] 08/15/2018    Obesity, morbid ([facility] 07/30/2018     Current Outpatient Medications   Medication Sig Dispense Refill    Eliquis 5 mg tablet Take 5 mg by mouth two (2) times a day. metoprolol succinate (Toprol XL) 25 mg XL tablet Take 1 Tablet by mouth daily. 30 Tablet 3    glimepiride (AMARYL) 1 mg tablet Take 1 mg by mouth every morning.       metOLazone (ZAROXOLYN) 2.5 mg tablet take 1 tablet by mouth daily if needed      furosemide (LASIX) 40 mg tablet furosemide 40 mg tablet   take 1 tablet by mouth once daily for LOWER EXT EDEMA      tamsulosin (FLOMAX) 0.4 mg capsule take 1-2 capsule by mouth every evening  1    ibuprofen (ADVIL) 100 mg tablet Take 100 mg by mouth every six (6) hours as needed for Pain.      phenytoin ER (DILANTIN ER) 100 mg ER capsule Take  by mouth two (2) times a day. Indications: Unknown amount       No Known Allergies  Past Medical History:   Diagnosis Date    Atrial fibrillation (HCC)     Cancer (HCC)     melanoma    Lymphedema     Other ill-defined conditions(799.89)     Seizure ([address]     Seizures ([address]     Sepsis ([address]      Past Surgical History:   Procedure Laterality Date    COLONOSCOPY N/A 3/13/2018    COLONOSCOPY performed by Jarrod Hicks MD at [facility] END[facility]COPY    HX APPENDECTOMY       Family History   Problem Relation Age of Onset    Stroke Mother     No Known Problems Father      Social History     Tobacco Use   Smoking Status Never   Smokeless Tobacco Never          Review of Systems, additional:  Constitutional: negative  Eyes: negative  Respiratory: negative  Cardiovascular: negative  Gastrointestinal: positive for abdominal pain  Musculoskeletal:negative  Neurological: negative  Behvioral/Psych: negative  Endocrine: negative  ENT: negative    Objective:     Visit Vitals  /72   Pulse 100   Temp 98.1 °F (36.7 °C) (Temporal)   Wt 142.9 kg (315 lb)   SpO2 97%   BMI 47.90 kg/m²     General:  alert, cooperative, no distress   Chest Wall: inspection normal - no chest wall deformities or tenderness, respiratory effort normal   Lung: clear to auscultation bilaterally   Heart:  irregularly irregular rhythm with rate 80, no JVD   Abdomen: soft, non-tender. Bowel sounds normal. No masses,  no organomegaly   Extremities: extremities normal, atraumatic, no cyanosis or edema Skin: no rashes   Neuro: alert, oriented, normal speech, no focal findings or movement disorder noted     [id number]. Atrial fibrillation. Right axis deviation. Occasional aberrantly conducted beat versus PVC. Assessment/Plan:       ICD-10-CM ICD-9-CM    1. Atrial fibrillation, chronic persistent  ([address] [id number] score 0.   High risk for anticoagulants related to alcohol in the past. Patient reports reduced alcohol consumption. Echocardiogram completed on 3/30/2022. EF 55 to 60%. Severely dilated right ventricle. Mitral annular calcification. Mild dilatation of left atrium. Patient had recent evaluation at [facility].  He was both hypokalemic and hypomagnesemic at that time. He has longstanding alcohol abuse. He was discharged for follow-up. We will continue Eliquis 5 mg twice daily he is on metoprolol succinate 25 mg daily. An MRI of the head was done at that time (8/3/2022) there were \"a couple of tiny caliber foci of acute/early subacute infarcts within disparate vascular territories of the right cerebrum/cerebellum. Ordered BMP and magnesium. Return in 3 months. AMB POC EKG ROUTINE W/ 12 LEADS, INTER & REP      CARDIAC HOLTER MONITOR   2. Paroxysmal atrial fibrillation ([address] patient now on metoprolol and Eliquis I48.0 427.31           3. Abnormal nuclear cardiac imaging test, normal perfusion with EF 43%. Echo EF at that time was 50%. R93.1 794.39    4. Seizures, taking Dilantin now.   5      Hypokalemia  6      Hypomagnesemia

## 2023-05-10 ENCOUNTER — OFFICE VISIT (OUTPATIENT)
Age: 63
End: 2023-05-10
Payer: MEDICARE

## 2023-05-10 VITALS
TEMPERATURE: 98 F | BODY MASS INDEX: 47.74 KG/M2 | WEIGHT: 314 LBS | OXYGEN SATURATION: 98 % | SYSTOLIC BLOOD PRESSURE: 151 MMHG | DIASTOLIC BLOOD PRESSURE: 82 MMHG | HEART RATE: 95 BPM

## 2023-05-10 DIAGNOSIS — I48.0 PAROXYSMAL ATRIAL FIBRILLATION (HCC): Primary | ICD-10-CM

## 2023-05-10 PROCEDURE — 99214 OFFICE O/P EST MOD 30 MIN: CPT | Performed by: INTERNAL MEDICINE

## 2023-05-10 RX ORDER — FAMOTIDINE 20 MG/1
20 TABLET, FILM COATED ORAL 2 TIMES DAILY
COMMUNITY

## 2023-05-10 RX ORDER — ASPIRIN 81 MG/1
81 TABLET ORAL DAILY
COMMUNITY

## 2023-05-10 RX ORDER — POTASSIUM CHLORIDE 750 MG/1
10 TABLET, EXTENDED RELEASE ORAL DAILY
COMMUNITY

## 2023-05-10 RX ORDER — LEVETIRACETAM 1000 MG/1
1000 TABLET ORAL DAILY
COMMUNITY

## 2023-05-10 RX ORDER — ATORVASTATIN CALCIUM 40 MG/1
40 TABLET, FILM COATED ORAL DAILY
COMMUNITY

## 2023-05-15 NOTE — PROGRESS NOTES
Subjective:      Nikhil Rizvi is seen in the office today for cardiac reevaluation. He is a 65 y.o. man that was hospitalized at [facility] with atrial fibrillation in February of 2019. During the course of his hospitalization, he had an echocardiogram which demonstrated an ejection fraction of 50%. There was no significant valvular pathology. He also had a nuclear cardiac stress test. The perfusion portion of the exam was normal. His ejection fraction was 43%. In the past, the patient was felt to have a [id number] score of 0-1. An anticoagulant was not prescribed. Additionally, the patient has a history of long history of excessive alcohol intake. In the office today, he reports another [address] [place] [address] evaluation (3/29/2023). He presented with complaints of shortness of breath and chest discomfort. He was in atrial fibrillation with a rapid ventricular response. An echocardiogram was done. EF was 55%. There was Concentric LVH. There was no significant valvular pathology. A CTA and MR of the head were ordered and completed. The MRI demonstrated acute bilateral frontal nonhemorrhagic infarctions. Question embolic event. He was taken off the Eliquis while hospitalized. He reports he is doing \"pretty good right now \". (Discussed the situation with his daughter)            Patient Active Problem List    Diagnosis Date Noted    Paroxysmal atrial fibrillation ([facility] 08/15/2018    Abnormal nuclear cardiac imaging test 08/15/2018    Epilepsy ([facility] 08/15/2018    Obesity, morbid ([facility] 07/30/2018     Current Outpatient Medications   Medication Sig Dispense Refill    Eliquis 5 mg tablet Take 5 mg by mouth two (2) times a day. metoprolol succinate (Toprol XL) 25 mg XL tablet Take 1 Tablet by mouth daily. 30 Tablet 3    glimepiride (AMARYL) 1 mg tablet Take 1 mg by mouth every morning.       metOLazone (ZAROXOLYN) 2.5 mg tablet take 1 tablet by mouth daily

## (undated) DEVICE — CONTAINER PREFIL FRMLN 15ML --

## (undated) DEVICE — SNARE POLYP OVAL TIP 30X55MM -- LARIAT

## (undated) DEVICE — BASIN EMESIS 500CC ROSE 250/CS 60/PLT: Brand: MEDEGEN MEDICAL PRODUCTS, LLC

## (undated) DEVICE — SYR 50ML SLIP TIP NSAF LF STRL --

## (undated) DEVICE — MEDI-VAC NON-CONDUCTIVE SUCTION TUBING: Brand: CARDINAL HEALTH

## (undated) DEVICE — REM POLYHESIVE ADULT PATIENT RETURN ELECTRODE: Brand: VALLEYLAB

## (undated) DEVICE — FLEX ADVANTAGE 1500CC: Brand: FLEX ADVANTAGE

## (undated) DEVICE — DEVICE INFL 60ML 12ATM CONVENIENT LOK REL HNDL HI PRSS FLX

## (undated) DEVICE — KENDALL 500 SERIES DIAPHORETIC FOAM MONITORING ELECTRODE - TEAR DROP SHAPE ( 30/PK): Brand: KENDALL

## (undated) DEVICE — KIT COLON W/ 1.1OZ LUB AND 2 END